# Patient Record
Sex: FEMALE | Race: WHITE | Employment: OTHER | URBAN - METROPOLITAN AREA
[De-identification: names, ages, dates, MRNs, and addresses within clinical notes are randomized per-mention and may not be internally consistent; named-entity substitution may affect disease eponyms.]

---

## 2017-03-22 ENCOUNTER — OFFICE VISIT (OUTPATIENT)
Dept: INTERNAL MEDICINE CLINIC | Facility: CLINIC | Age: 72
End: 2017-03-22

## 2017-03-22 VITALS
OXYGEN SATURATION: 99 % | RESPIRATION RATE: 18 BRPM | TEMPERATURE: 98 F | DIASTOLIC BLOOD PRESSURE: 60 MMHG | WEIGHT: 166.81 LBS | HEIGHT: 62 IN | BODY MASS INDEX: 30.69 KG/M2 | SYSTOLIC BLOOD PRESSURE: 120 MMHG | HEART RATE: 63 BPM

## 2017-03-22 DIAGNOSIS — R10.84 GENERALIZED ABDOMINAL PAIN: ICD-10-CM

## 2017-03-22 DIAGNOSIS — M54.2 NECK PAIN: ICD-10-CM

## 2017-03-22 DIAGNOSIS — M25.50 ARTHRALGIA, UNSPECIFIED JOINT: Primary | ICD-10-CM

## 2017-03-22 DIAGNOSIS — E78.49 OTHER HYPERLIPIDEMIA: ICD-10-CM

## 2017-03-22 DIAGNOSIS — K59.09 OTHER CONSTIPATION: ICD-10-CM

## 2017-03-22 LAB
ALBUMIN SERPL BCP-MCNC: 4.1 G/DL (ref 3.5–4.8)
ALBUMIN/GLOB SERPL: 1.5 {RATIO} (ref 1–2)
ALP SERPL-CCNC: 68 U/L (ref 32–100)
ALT SERPL-CCNC: 17 U/L (ref 14–54)
ANION GAP SERPL CALC-SCNC: 9 MMOL/L (ref 0–18)
AST SERPL-CCNC: 22 U/L (ref 15–41)
BILIRUB SERPL-MCNC: 0.4 MG/DL (ref 0.3–1.2)
BUN SERPL-MCNC: 12 MG/DL (ref 8–20)
BUN/CREAT SERPL: 17.4 (ref 10–20)
CALCIUM SERPL-MCNC: 9.4 MG/DL (ref 8.5–10.5)
CHLORIDE SERPL-SCNC: 105 MMOL/L (ref 95–110)
CHOLEST SERPL-MCNC: 213 MG/DL (ref 110–200)
CO2 SERPL-SCNC: 27 MMOL/L (ref 22–32)
CREAT SERPL-MCNC: 0.69 MG/DL (ref 0.5–1.5)
ERYTHROCYTE [DISTWIDTH] IN BLOOD BY AUTOMATED COUNT: 13.8 % (ref 11–15)
GLOBULIN PLAS-MCNC: 2.7 G/DL (ref 2.5–3.7)
GLUCOSE SERPL-MCNC: 84 MG/DL (ref 70–99)
HCT VFR BLD AUTO: 39.1 % (ref 35–48)
HDLC SERPL-MCNC: 47 MG/DL
HGB BLD-MCNC: 12.8 G/DL (ref 12–16)
LDLC SERPL CALC-MCNC: 135 MG/DL (ref 0–99)
MCH RBC QN AUTO: 31.7 PG (ref 27–32)
MCHC RBC AUTO-ENTMCNC: 32.9 G/DL (ref 32–37)
MCV RBC AUTO: 96.4 FL (ref 80–100)
NONHDLC SERPL-MCNC: 166 MG/DL
OSMOLALITY UR CALC.SUM OF ELEC: 291 MOSM/KG (ref 275–295)
PLATELET # BLD AUTO: 251 K/UL (ref 140–400)
PMV BLD AUTO: 8.5 FL (ref 7.4–10.3)
POTASSIUM SERPL-SCNC: 4.1 MMOL/L (ref 3.3–5.1)
PROT SERPL-MCNC: 6.8 G/DL (ref 5.9–8.4)
RBC # BLD AUTO: 4.05 M/UL (ref 3.7–5.4)
SODIUM SERPL-SCNC: 141 MMOL/L (ref 136–144)
TRIGL SERPL-MCNC: 153 MG/DL (ref 1–149)
TSH SERPL-ACNC: 2.08 UIU/ML (ref 0.34–5.6)
WBC # BLD AUTO: 4.8 K/UL (ref 4–11)

## 2017-03-22 PROCEDURE — 85027 COMPLETE CBC AUTOMATED: CPT | Performed by: INTERNAL MEDICINE

## 2017-03-22 PROCEDURE — 80053 COMPREHEN METABOLIC PANEL: CPT | Performed by: INTERNAL MEDICINE

## 2017-03-22 PROCEDURE — 99214 OFFICE O/P EST MOD 30 MIN: CPT | Performed by: INTERNAL MEDICINE

## 2017-03-22 PROCEDURE — 80061 LIPID PANEL: CPT | Performed by: INTERNAL MEDICINE

## 2017-03-22 PROCEDURE — 84443 ASSAY THYROID STIM HORMONE: CPT | Performed by: INTERNAL MEDICINE

## 2017-03-22 NOTE — PROGRESS NOTES
HPI:    Patient ID: Nima Sanchez is a 67year old female. HPIgeneralized joint pains and continued abdominal pains and constipation. Has not been to gi yet.     Review of Systems         Current Outpatient Prescriptions:  triamcinolone acetonide 0.1

## 2017-03-24 ENCOUNTER — HOSPITAL ENCOUNTER (OUTPATIENT)
Dept: GENERAL RADIOLOGY | Facility: HOSPITAL | Age: 72
Discharge: HOME | End: 2017-03-24
Attending: INTERNAL MEDICINE
Payer: MEDICAID

## 2017-03-24 DIAGNOSIS — M25.50 ARTHRALGIA, UNSPECIFIED JOINT: ICD-10-CM

## 2017-03-24 DIAGNOSIS — M54.2 NECK PAIN: ICD-10-CM

## 2017-03-24 PROCEDURE — 73120 X-RAY EXAM OF HAND: CPT

## 2017-03-24 PROCEDURE — 72050 X-RAY EXAM NECK SPINE 4/5VWS: CPT

## 2017-03-28 ENCOUNTER — TELEPHONE (OUTPATIENT)
Dept: INTERNAL MEDICINE CLINIC | Facility: CLINIC | Age: 72
End: 2017-03-28

## 2017-03-28 DIAGNOSIS — M19.042 OSTEOARTHRITIS OF BOTH HANDS, UNSPECIFIED OSTEOARTHRITIS TYPE: ICD-10-CM

## 2017-03-28 DIAGNOSIS — M47.812 OSTEOARTHRITIS OF CERVICAL SPINE, UNSPECIFIED SPINAL OSTEOARTHRITIS COMPLICATION STATUS: Primary | ICD-10-CM

## 2017-03-28 DIAGNOSIS — M19.041 OSTEOARTHRITIS OF BOTH HANDS, UNSPECIFIED OSTEOARTHRITIS TYPE: ICD-10-CM

## 2017-05-09 ENCOUNTER — OFFICE VISIT (OUTPATIENT)
Dept: OCCUPATIONAL MEDICINE | Facility: HOSPITAL | Age: 72
End: 2017-05-09
Attending: INTERNAL MEDICINE
Payer: MEDICAID

## 2017-05-09 ENCOUNTER — TELEPHONE (OUTPATIENT)
Dept: INTERNAL MEDICINE CLINIC | Facility: CLINIC | Age: 72
End: 2017-05-09

## 2017-05-09 DIAGNOSIS — M19.041 OSTEOARTHRITIS OF BOTH HANDS, UNSPECIFIED OSTEOARTHRITIS TYPE: ICD-10-CM

## 2017-05-09 DIAGNOSIS — M19.042 OSTEOARTHRITIS OF BOTH HANDS, UNSPECIFIED OSTEOARTHRITIS TYPE: ICD-10-CM

## 2017-05-09 DIAGNOSIS — M47.812 OSTEOARTHRITIS OF CERVICAL SPINE, UNSPECIFIED SPINAL OSTEOARTHRITIS COMPLICATION STATUS: Primary | ICD-10-CM

## 2017-05-09 NOTE — PROGRESS NOTES
Patient Name: Skyler Avendano, : 3/9/1945, MRN: [id number]   Date:  2017  Referring Physician:  Gricel Silverio    Diagnosis: Cervical OA and hand OA    Occupational Therapy Screen    Pt has attended 1 Occupational Therapy session for screen.     P contact me at Dept: [phone].     Sincerely,  Kim Rose    Electronically signed by therapist: Kim Rose OT

## 2017-05-11 ENCOUNTER — APPOINTMENT (OUTPATIENT)
Dept: OCCUPATIONAL MEDICINE | Facility: HOSPITAL | Age: 72
End: 2017-05-11
Attending: INTERNAL MEDICINE
Payer: MEDICAID

## 2017-05-15 ENCOUNTER — APPOINTMENT (OUTPATIENT)
Dept: OCCUPATIONAL MEDICINE | Facility: HOSPITAL | Age: 72
End: 2017-05-15
Attending: INTERNAL MEDICINE
Payer: MEDICAID

## 2017-05-17 ENCOUNTER — APPOINTMENT (OUTPATIENT)
Dept: OCCUPATIONAL MEDICINE | Facility: HOSPITAL | Age: 72
End: 2017-05-17
Attending: INTERNAL MEDICINE
Payer: MEDICAID

## 2017-05-18 ENCOUNTER — TELEPHONE (OUTPATIENT)
Dept: INTERNAL MEDICINE CLINIC | Facility: CLINIC | Age: 72
End: 2017-05-18

## 2017-05-18 ENCOUNTER — OFFICE VISIT (OUTPATIENT)
Dept: PHYSICAL THERAPY | Facility: HOSPITAL | Age: 72
End: 2017-05-18
Attending: INTERNAL MEDICINE
Payer: MEDICAID

## 2017-05-18 DIAGNOSIS — M47.812 OSTEOARTHRITIS OF CERVICAL SPINE, UNSPECIFIED SPINAL OSTEOARTHRITIS COMPLICATION STATUS: Primary | ICD-10-CM

## 2017-05-18 PROCEDURE — 97110 THERAPEUTIC EXERCISES: CPT

## 2017-05-18 PROCEDURE — 97162 PT EVAL MOD COMPLEX 30 MIN: CPT

## 2017-05-18 NOTE — PROGRESS NOTES
CERVICAL SPINE EVALUATION:   Referring Physician: Kaleb Sosa MD    Diagnosis: cervical pain Date of Service: 5/8/17   Date of Onset: 6 months prior        SUBJECTIVE:   PATIENT SUMMARY:  Fadia Knight is a 67year old y/o female who presents to t 39 +upper csp   L Rotation 51 + L upper trap   Protrusion wfl +upper csp   Retraction Dec  +skull       Shoulder AROM:     R    L   Flex dec        dec   Abd dec dec   ER dec dec   IR dec Dec     Strength UE:   -/5    R  L   Shoulder flex   4-    4-   Shou Manual Therapy; Therapeutic Exercises; Neuromuscular Re-education; Therapeutic Activity; Ultrasound;  Electrical Stim; Traction; Patient education; Home exercise program instruction;     Education or treatment limitation: None  Rehab Potential: good    FOTO

## 2017-05-22 ENCOUNTER — APPOINTMENT (OUTPATIENT)
Dept: OCCUPATIONAL MEDICINE | Facility: HOSPITAL | Age: 72
End: 2017-05-22
Attending: INTERNAL MEDICINE
Payer: MEDICAID

## 2017-05-23 ENCOUNTER — OFFICE VISIT (OUTPATIENT)
Dept: PHYSICAL THERAPY | Facility: HOSPITAL | Age: 72
End: 2017-05-23
Attending: INTERNAL MEDICINE
Payer: MEDICAID

## 2017-05-23 PROCEDURE — 97140 MANUAL THERAPY 1/> REGIONS: CPT

## 2017-05-23 PROCEDURE — 97110 THERAPEUTIC EXERCISES: CPT

## 2017-05-23 NOTE — PROGRESS NOTES
Diagnosis: Cervical pain  Authorized # of Visits:  2/6 [facility] Medicare)         Next MD visit: none scheduled  Fall Risk: standard         Precautions: n/a           Medication Changes since last visit?: No  Subjective: Patient reports she feels \"the eamon

## 2017-05-24 ENCOUNTER — APPOINTMENT (OUTPATIENT)
Dept: OCCUPATIONAL MEDICINE | Facility: HOSPITAL | Age: 72
End: 2017-05-24
Attending: INTERNAL MEDICINE
Payer: MEDICAID

## 2017-05-26 ENCOUNTER — OFFICE VISIT (OUTPATIENT)
Dept: PHYSICAL THERAPY | Facility: HOSPITAL | Age: 72
End: 2017-05-26
Attending: INTERNAL MEDICINE
Payer: MEDICAID

## 2017-05-26 PROCEDURE — 97140 MANUAL THERAPY 1/> REGIONS: CPT

## 2017-05-26 PROCEDURE — 97110 THERAPEUTIC EXERCISES: CPT

## 2017-05-26 NOTE — PROGRESS NOTES
Diagnosis: Cervical pain  Authorized # of Visits:  3/6 [facility] Medicare)         Next MD visit: none scheduled  Fall Risk: standard         Precautions: n/a           Medication Changes since last visit?: No  Subjective: Patient reports her neck is feeling

## 2017-05-31 ENCOUNTER — APPOINTMENT (OUTPATIENT)
Dept: OCCUPATIONAL MEDICINE | Facility: HOSPITAL | Age: 72
End: 2017-05-31
Attending: INTERNAL MEDICINE
Payer: MEDICAID

## 2017-06-01 ENCOUNTER — OFFICE VISIT (OUTPATIENT)
Dept: PHYSICAL THERAPY | Facility: HOSPITAL | Age: 72
End: 2017-06-01
Attending: INTERNAL MEDICINE
Payer: MEDICAID

## 2017-06-01 PROCEDURE — 97110 THERAPEUTIC EXERCISES: CPT

## 2017-06-01 PROCEDURE — 97140 MANUAL THERAPY 1/> REGIONS: CPT

## 2017-06-01 NOTE — PROGRESS NOTES
Diagnosis: Cervical pain  Authorized # of Visits:  4/6 [facility] Medicare)         Next MD visit: none scheduled  Fall Risk: standard         Precautions: n/a           Medication Changes since last visit?: No  Subjective:  \"The pain is less intense and less

## 2017-06-02 ENCOUNTER — APPOINTMENT (OUTPATIENT)
Dept: OCCUPATIONAL MEDICINE | Facility: HOSPITAL | Age: 72
End: 2017-06-02
Attending: INTERNAL MEDICINE

## 2017-06-05 ENCOUNTER — OFFICE VISIT (OUTPATIENT)
Dept: PHYSICAL THERAPY | Facility: HOSPITAL | Age: 72
End: 2017-06-05
Attending: INTERNAL MEDICINE
Payer: MEDICAID

## 2017-06-05 PROCEDURE — 97110 THERAPEUTIC EXERCISES: CPT

## 2017-06-05 PROCEDURE — 97140 MANUAL THERAPY 1/> REGIONS: CPT

## 2017-06-05 NOTE — PROGRESS NOTES
Diagnosis: Cervical pain  Authorized # of Visits:  5/6 [facility] Medicare)         Next MD visit: none scheduled  Fall Risk: standard         Precautions: n/a           Medication Changes since last visit?: No  Subjective: \"I don't have any pain in my neck.

## 2017-06-08 ENCOUNTER — OFFICE VISIT (OUTPATIENT)
Dept: PHYSICAL THERAPY | Facility: HOSPITAL | Age: 72
End: 2017-06-08
Attending: INTERNAL MEDICINE
Payer: MEDICAID

## 2017-06-08 PROCEDURE — 97110 THERAPEUTIC EXERCISES: CPT

## 2017-06-08 PROCEDURE — 97140 MANUAL THERAPY 1/> REGIONS: CPT

## 2017-06-08 NOTE — PROGRESS NOTES
Patient Name: Kishor Pereira, : 3/9/1945, MRN: [id number]   Date:  2017  Referring Physician:  Aminata Fletcher    Diagnosis: Cervical pain   Discharge Summary    Pt has attended 6, cancelled 0, and no shown 0 visits in Physical Therapy.      Progr suboccipitals, upper traps, levator, paraspinals. Upper Limb Tension Tests: improved neural tension  Neuro Screen: intact to soft touch        Goals:    1. Patient will sit with proper posture while working at computer for one hour without c/o pain.  Met

## 2017-06-12 ENCOUNTER — APPOINTMENT (OUTPATIENT)
Dept: PHYSICAL THERAPY | Facility: HOSPITAL | Age: 72
End: 2017-06-12
Attending: INTERNAL MEDICINE
Payer: MEDICAID

## 2017-06-14 ENCOUNTER — APPOINTMENT (OUTPATIENT)
Dept: PHYSICAL THERAPY | Facility: HOSPITAL | Age: 72
End: 2017-06-14
Attending: INTERNAL MEDICINE
Payer: MEDICAID

## 2017-06-15 ENCOUNTER — APPOINTMENT (OUTPATIENT)
Dept: PHYSICAL THERAPY | Facility: HOSPITAL | Age: 72
End: 2017-06-15
Attending: INTERNAL MEDICINE
Payer: MEDICAID

## 2017-07-21 ENCOUNTER — APPOINTMENT (OUTPATIENT)
Dept: GENERAL RADIOLOGY | Age: 72
End: 2017-07-21
Attending: EMERGENCY MEDICINE
Payer: MEDICAID

## 2017-07-21 ENCOUNTER — HOSPITAL ENCOUNTER (OUTPATIENT)
Age: 72
Discharge: HOME | End: 2017-07-21
Attending: EMERGENCY MEDICINE
Payer: MEDICAID

## 2017-07-21 VITALS
BODY MASS INDEX: 29 KG/M2 | DIASTOLIC BLOOD PRESSURE: 76 MMHG | SYSTOLIC BLOOD PRESSURE: 123 MMHG | OXYGEN SATURATION: 97 % | WEIGHT: 160 LBS | TEMPERATURE: 99 F | HEART RATE: 64 BPM | RESPIRATION RATE: 16 BRPM

## 2017-07-21 DIAGNOSIS — S93.601A RIGHT FOOT SPRAIN, INITIAL ENCOUNTER: Primary | ICD-10-CM

## 2017-07-21 PROCEDURE — 73630 X-RAY EXAM OF FOOT: CPT | Performed by: EMERGENCY MEDICINE

## 2017-07-21 PROCEDURE — 99213 OFFICE O/P EST LOW 20 MIN: CPT

## 2017-07-21 PROCEDURE — 99203 OFFICE O/P NEW LOW 30 MIN: CPT

## 2017-07-21 RX ORDER — ACETAMINOPHEN 325 MG/1
325 TABLET ORAL EVERY 6 HOURS PRN
COMMUNITY

## 2017-07-21 NOTE — ED PROVIDER NOTES
Patient Seen in: [address] [facility] [address] [facility] In [address] [place] [address]    History   Patient presents with:  Lower Extremity Injury (musculoskeletal)    Stated Complaint: r foot pain    HPI    Patient presents to the urgent care center after awkwardly coming down seconds. There is no abrasions lacerations or skin lesions noted. There is normal sensation light touch. There is tenderness over the second third and fourth toes into the metatarsal region just proximal to the phalanges.   There is no fifth metatarsal d

## 2017-07-21 NOTE — ED INITIAL ASSESSMENT (HPI)
Twisted right foot 3 days ago and still hurts. Pain dorsal aspect no gross edema or bruising no swelling. albe to walk.

## 2017-11-04 ENCOUNTER — NURSE ONLY (OUTPATIENT)
Dept: INTERNAL MEDICINE CLINIC | Facility: CLINIC | Age: 72
End: 2017-11-04

## 2017-11-04 DIAGNOSIS — F01.50 VASCULAR DEMENTIA WITHOUT BEHAVIORAL DISTURBANCE (HCC): ICD-10-CM

## 2017-11-04 DIAGNOSIS — E78.5 HYPERLIPIDEMIA, UNSPECIFIED HYPERLIPIDEMIA TYPE: Primary | ICD-10-CM

## 2017-11-04 PROCEDURE — 86003 ALLG SPEC IGE CRUDE XTRC EA: CPT | Performed by: INTERNAL MEDICINE

## 2017-11-04 PROCEDURE — 85025 COMPLETE CBC W/AUTO DIFF WBC: CPT | Performed by: INTERNAL MEDICINE

## 2017-11-04 PROCEDURE — 82607 VITAMIN B-12: CPT | Performed by: INTERNAL MEDICINE

## 2017-11-04 PROCEDURE — 84443 ASSAY THYROID STIM HORMONE: CPT | Performed by: INTERNAL MEDICINE

## 2017-11-04 PROCEDURE — 36415 COLL VENOUS BLD VENIPUNCTURE: CPT | Performed by: INTERNAL MEDICINE

## 2017-11-04 PROCEDURE — 80061 LIPID PANEL: CPT | Performed by: INTERNAL MEDICINE

## 2017-11-04 PROCEDURE — 80053 COMPREHEN METABOLIC PANEL: CPT | Performed by: INTERNAL MEDICINE

## 2017-11-04 PROCEDURE — 82785 ASSAY OF IGE: CPT | Performed by: INTERNAL MEDICINE

## 2017-11-04 PROCEDURE — 82306 VITAMIN D 25 HYDROXY: CPT | Performed by: INTERNAL MEDICINE

## 2017-11-04 NOTE — PROGRESS NOTES
HPI:   Elba Coleman is a 67year old female who presents for a MA Supervisit.     Patient Active Problem List:     Age-related nuclear cataract of both eyes seeing opthamology     Meibomian gland dysfunction (MGD), bilateral, both upper and lower lids day activities?: 0-No     Have you had any memory issues?: 1-Yes    Fall/Risk Scorin    Scoring Interpretation: 0 - 3 No Risk     Depression Screening (PHQ-2/PHQ-9): Over the LAST 2 WEEKS   Little interest or pleasure in doing things (over the last two :       REVIEW OF SYSTEMS:   GENERAL: feels well otherwise  SKIN: denies any unusual skin lesions  EYES: denies blurred vision or double vision  HEENT: denies nasal congestion, sinus pain or ST  LUNGS: denies shortness of breath with exertion is a 67year old female who presents for a Medicare Assessment. PLAN SUMMARY:   Diagnoses and all orders for this visit:    Vascular dementia without behavioral disturbance  -     Cancel: CT ANGIOGRAPHY, BRAIN (CONTRAST ONLY)(CPT=70496);  Future  - Annually     Bone Density Screening      Dexascan Every two years No results found for this or any previous visit. No flowsheet data found.     Pap and Pelvic      Pap  Annually if high risk There are no preventive care reminders to display for this patient 11/04/2017 146 (H)   06/24/2016 159 (H)    No flowsheet data found. Dilated Eye exam  Annually No flowsheet data found. No flowsheet data found. COPD      Spirometry Testing Annually No results found for this or any previous visit.  No flowsheet d capsule (50,000 Units total) by mouth once a week. Disp: 4 capsule Rfl: 0   acetaminophen 325 MG Oral Tab Take 325 mg by mouth every 6 (six) hours as needed for Pain.  Disp:  Rfl:    triamcinolone acetonide 0.1 % External Cream Apply topically 2 (two) times

## 2017-11-04 NOTE — PROGRESS NOTES
Pt's  verified  Pt presented for a fasting blood draw. Per Dr. Darleen Glover ordered LIPID VIT D ALLERGY REGION 8 VIT B12 CBC TSHR. Pt tolerated venipuncture well,specimens drawn from Rt  arm  and sent out to [address] [place] [address] lab for testing.

## 2017-11-07 NOTE — PROGRESS NOTES
Pt notified high cholesterol should treat and to start Zocor per Keenan Walls Fuelling all other labs okay-

## 2017-11-11 ENCOUNTER — HOSPITAL ENCOUNTER (OUTPATIENT)
Dept: CT IMAGING | Facility: HOSPITAL | Age: 72
Discharge: HOME | End: 2017-11-11
Attending: INTERNAL MEDICINE
Payer: MEDICAID

## 2017-11-11 DIAGNOSIS — F01.50 VASCULAR DEMENTIA WITHOUT BEHAVIORAL DISTURBANCE (HCC): ICD-10-CM

## 2017-11-11 PROCEDURE — 70470 CT HEAD/BRAIN W/O & W/DYE: CPT | Performed by: INTERNAL MEDICINE

## 2017-11-24 ENCOUNTER — MED REC SCAN ONLY (OUTPATIENT)
Dept: INTERNAL MEDICINE CLINIC | Facility: CLINIC | Age: 72
End: 2017-11-24

## 2017-12-07 ENCOUNTER — OFFICE VISIT (OUTPATIENT)
Dept: INTERNAL MEDICINE CLINIC | Facility: CLINIC | Age: 72
End: 2017-12-07

## 2017-12-07 VITALS
OXYGEN SATURATION: 99 % | SYSTOLIC BLOOD PRESSURE: 102 MMHG | TEMPERATURE: 98 F | BODY MASS INDEX: 29 KG/M2 | WEIGHT: 160 LBS | DIASTOLIC BLOOD PRESSURE: 62 MMHG | HEART RATE: 68 BPM

## 2017-12-07 DIAGNOSIS — E55.9 VITAMIN D DEFICIENCY: ICD-10-CM

## 2017-12-07 DIAGNOSIS — R41.3 MEMORY DEFICIT: ICD-10-CM

## 2017-12-07 DIAGNOSIS — E78.5 HYPERLIPIDEMIA, UNSPECIFIED HYPERLIPIDEMIA TYPE: Primary | ICD-10-CM

## 2017-12-07 DIAGNOSIS — R42 DIZZINESS: ICD-10-CM

## 2017-12-07 DIAGNOSIS — K21.9 GASTROESOPHAGEAL REFLUX DISEASE, ESOPHAGITIS PRESENCE NOT SPECIFIED: ICD-10-CM

## 2017-12-07 DIAGNOSIS — Z12.11 ENCOUNTER FOR SCREENING COLONOSCOPY: ICD-10-CM

## 2017-12-07 PROCEDURE — 99214 OFFICE O/P EST MOD 30 MIN: CPT | Performed by: INTERNAL MEDICINE

## 2017-12-07 NOTE — PROGRESS NOTES
Makenna Taylor is a 67year old female. Patient presents with: Follow - Up: pt presents to clinic for 1 month follow up on new rxs simvastatin and Vit D. per pt tolerating rxs well.        HPI:         Taking Vitamin D 50,000 weekly; and simvastatin 20 frequency or urgency  MUSCULOSKELETAL: denies unusual joint pain, swelling or myalgias  SKIN: denies rash or lesions  NEUROLOGICAL: denies frequent headaches, or focal deficits  HEME: denies excessive bruising or bleeding  PSYCH: denies depression or anxie Aspergillus Fumigatus <0.10 <0.10 kUA/L   Allergen Bermuda Grass <0.10 <0.10 kUA/L   Allergen Box Elder <0.10 <0.10 kUA/L   Allergen C.  Herbarum <0.10 <0.10 kUA/L   Allergen Cat Dander <0.10 <0.10 kUA/L   Allergen Cockroach <0.10 <0.10 kUA/L   Allergen Com 0.0 - 1.0 K/UL   Eosinophil Absolute 0.1 0.0 - 0.7 K/UL   Basophil Absolute 0.0 0.0 - 0.2 K/UL        Ct Brain (w+wo) (oye=89756)    Result Date: 11/11/2017  PROCEDURE: CT BRAIN (W+WO) (ORD=[id number])  COMPARISON: None available.   INDICATIONS: New onset of mem (CST): Saba Chinchilla MD on 11/11/2017 at 10:10          CONCLUSION:  1. No acute intracranial process by noncontrast/contrast-enhanced CT technique. 2. Senescent changes of parenchymal volume loss with sequela of chronic microvascular ischemic disease.

## 2018-02-28 NOTE — PROGRESS NOTES
HPI:    Patient ID: Nevaeh Nagy is a 67year old female. Patient here for fu on memory loss which is likely vascular type. Patient has good days and bad. Daughter is of the opinion of trying a medical intervention to impact on the memory. HAs had b Exam    Constitutional: She is oriented to person, place, and time. She appears well-developed and well-nourished. HENT:   Head: Normocephalic and atraumatic.    Right Ear: Tympanic membrane and ear canal normal.   Nose: Nose normal.   Slight eczema inear

## 2018-04-17 ENCOUNTER — OFFICE VISIT (OUTPATIENT)
Dept: INTERNAL MEDICINE CLINIC | Facility: CLINIC | Age: 73
End: 2018-04-17

## 2018-04-17 VITALS
OXYGEN SATURATION: 98 % | RESPIRATION RATE: 17 BRPM | BODY MASS INDEX: 30 KG/M2 | HEART RATE: 64 BPM | TEMPERATURE: 98 F | WEIGHT: 163 LBS | SYSTOLIC BLOOD PRESSURE: 132 MMHG | HEIGHT: 62 IN | DIASTOLIC BLOOD PRESSURE: 64 MMHG

## 2018-04-17 DIAGNOSIS — M81.8 OSTEOPOROSIS OF DISUSE: ICD-10-CM

## 2018-04-17 DIAGNOSIS — E78.2 MIXED HYPERLIPIDEMIA: Primary | ICD-10-CM

## 2018-04-17 DIAGNOSIS — F01.50 VASCULAR DEMENTIA WITHOUT BEHAVIORAL DISTURBANCE (HCC): ICD-10-CM

## 2018-04-17 PROCEDURE — 80053 COMPREHEN METABOLIC PANEL: CPT | Performed by: INTERNAL MEDICINE

## 2018-04-17 PROCEDURE — 80061 LIPID PANEL: CPT | Performed by: INTERNAL MEDICINE

## 2018-04-17 PROCEDURE — 99214 OFFICE O/P EST MOD 30 MIN: CPT | Performed by: INTERNAL MEDICINE

## 2018-04-17 PROCEDURE — 36415 COLL VENOUS BLD VENIPUNCTURE: CPT | Performed by: INTERNAL MEDICINE

## 2018-04-17 RX ORDER — DONEPEZIL HYDROCHLORIDE 10 MG/1
10 TABLET, FILM COATED ORAL NIGHTLY
Qty: 90 TABLET | Refills: 3 | Status: SHIPPED | OUTPATIENT
Start: 2018-04-17 | End: 2019-04-01

## 2018-04-17 NOTE — PROGRESS NOTES
Pt's  verified  Pt presented for a fasting blood draw. Per PRUSEK ordered LIPID CMP. Pt tolerated venipuncture well,specimens drawn from LT  arm  and sent out to [facility] lab for testing.

## 2018-04-17 NOTE — PROGRESS NOTES
HPI:    Patient ID: Elba Coleman is a 68year old female. Pt here for a fu on vascular dementia - has been feeling no ill side effects with Aricept rx. Appears to have no chenge in overqall behavior per daughters view. is due for lipids check - is fa cranial nerve deficit. Skin: Skin is warm and dry. No lesion noted.    Psychiatric: Her behavior is normal.   Mild to moderate memory impairment per  dtr              ASSESSMENT/PLAN:   Mixed hyperlipidemia  (primary encounter diagnosis) recheck

## 2018-04-18 RX ORDER — SIMVASTATIN 20 MG
20 TABLET ORAL NIGHTLY
Qty: 30 TABLET | Refills: 11 | Status: SHIPPED | OUTPATIENT
Start: 2018-04-18 | End: 2019-04-01

## 2018-05-29 ENCOUNTER — TELEPHONE (OUTPATIENT)
Dept: GASTROENTEROLOGY | Facility: CLINIC | Age: 73
End: 2018-05-29

## 2018-05-29 ENCOUNTER — OFFICE VISIT (OUTPATIENT)
Dept: GASTROENTEROLOGY | Facility: CLINIC | Age: 73
End: 2018-05-29

## 2018-05-29 VITALS
HEIGHT: 62 IN | BODY MASS INDEX: 30 KG/M2 | DIASTOLIC BLOOD PRESSURE: 79 MMHG | HEART RATE: 72 BPM | WEIGHT: 163 LBS | SYSTOLIC BLOOD PRESSURE: 124 MMHG

## 2018-05-29 DIAGNOSIS — Z12.11 SCREENING FOR COLON CANCER: Primary | ICD-10-CM

## 2018-05-29 DIAGNOSIS — Z87.19 HX OF GASTROESOPHAGEAL REFLUX (GERD): ICD-10-CM

## 2018-05-29 DIAGNOSIS — Z12.11 ENCOUNTER FOR COLORECTAL CANCER SCREENING: Primary | ICD-10-CM

## 2018-05-29 DIAGNOSIS — Z12.12 ENCOUNTER FOR COLORECTAL CANCER SCREENING: Primary | ICD-10-CM

## 2018-05-29 DIAGNOSIS — Z01.818 PREOPERATIVE CLEARANCE: ICD-10-CM

## 2018-05-29 DIAGNOSIS — R10.13 DYSPEPSIA: ICD-10-CM

## 2018-05-29 DIAGNOSIS — Z87.19 HISTORY OF GASTROESOPHAGEAL REFLUX (GERD): ICD-10-CM

## 2018-05-29 PROCEDURE — 99244 OFF/OP CNSLTJ NEW/EST MOD 40: CPT | Performed by: INTERNAL MEDICINE

## 2018-05-29 NOTE — PROGRESS NOTES
HPI:    Patient ID: Rudolph Psator is a 68year old female. HPI    Review of Systems   Constitutional: Negative for activity change, appetite change, chills, diaphoresis, fatigue, fever and unexpected weight change.    HENT: Negative for congestion, ear well-developed and well-nourished. No distress. HENT:   Head: Normocephalic and atraumatic. Mouth/Throat: Oropharynx is clear and moist. No oropharyngeal exudate. Eyes: Conjunctivae and EOM are normal. Pupils are equal, round, and reactive to light.

## 2018-05-29 NOTE — H&P
History of present illness: This is a 79-year-old female referred by Dr. Leopold Bunk for a colorectal cancer screening evaluation.   The patient is here with her daughter [facility]  The patient has never undergone colo plan:    1. Colorectal screening    2. Dyspepsia/GERD    3. Preoperative clearance: This is a 51-year-old female who is ASA class II and is a good candidate for colorectal screening. She also has dyspepsia and chronic GERD symptoms.   I have advised

## 2018-05-29 NOTE — TELEPHONE ENCOUNTER
Scheduled for:  Colon/EGD  Provider Name: HUNTER  Date:  6-20-18  Location:  [address] [place] [address] 1  Sedation:  MAC  Time:  9:30am, arrival 8:30am  Prep: Miralax/Gatorade  Meds/Allergies Reconciled?:  yes  Diagnosis with codes:  Screening Z12,11, dyspepsia R10.13, Hx of GERD Z

## 2018-06-20 ENCOUNTER — TELEPHONE (OUTPATIENT)
Dept: GASTROENTEROLOGY | Facility: CLINIC | Age: 73
End: 2018-06-20

## 2018-06-20 ENCOUNTER — HOSPITAL ENCOUNTER (OUTPATIENT)
Age: 73
Setting detail: HOSPITAL OUTPATIENT SURGERY
Discharge: HOME | End: 2018-06-20
Attending: INTERNAL MEDICINE | Admitting: INTERNAL MEDICINE
Payer: MEDICAID

## 2018-06-20 ENCOUNTER — SURGERY (OUTPATIENT)
Age: 73
End: 2018-06-20

## 2018-06-20 ENCOUNTER — ANESTHESIA EVENT (OUTPATIENT)
Dept: ENDOSCOPY | Age: 73
End: 2018-06-20
Payer: MEDICAID

## 2018-06-20 ENCOUNTER — ANESTHESIA (OUTPATIENT)
Dept: ENDOSCOPY | Age: 73
End: 2018-06-20
Payer: MEDICAID

## 2018-06-20 VITALS
HEART RATE: 65 BPM | TEMPERATURE: 99 F | WEIGHT: 160 LBS | OXYGEN SATURATION: 98 % | RESPIRATION RATE: 18 BRPM | BODY MASS INDEX: 26.66 KG/M2 | SYSTOLIC BLOOD PRESSURE: 116 MMHG | HEIGHT: 65 IN | DIASTOLIC BLOOD PRESSURE: 70 MMHG

## 2018-06-20 DIAGNOSIS — Z87.19 HISTORY OF GASTROESOPHAGEAL REFLUX (GERD): ICD-10-CM

## 2018-06-20 DIAGNOSIS — Z12.11 SCREENING FOR COLON CANCER: ICD-10-CM

## 2018-06-20 DIAGNOSIS — R10.13 DYSPEPSIA: ICD-10-CM

## 2018-06-20 PROBLEM — K31.9 GASTROPATHY: Status: ACTIVE | Noted: 2018-06-20

## 2018-06-20 PROBLEM — K29.40 ATROPHIC GASTRITIS: Status: ACTIVE | Noted: 2018-06-20

## 2018-06-20 PROBLEM — K64.8 INTERNAL HEMORRHOIDS WITHOUT COMPLICATION: Status: ACTIVE | Noted: 2018-06-20

## 2018-06-20 PROBLEM — K57.30 DIVERTICULOSIS LARGE INTESTINE W/O PERFORATION OR ABSCESS W/O BLEEDING: Status: ACTIVE | Noted: 2018-06-20

## 2018-06-20 PROCEDURE — 45378 DIAGNOSTIC COLONOSCOPY: CPT | Performed by: INTERNAL MEDICINE

## 2018-06-20 PROCEDURE — 99070 SPECIAL SUPPLIES PHYS/QHP: CPT | Performed by: INTERNAL MEDICINE

## 2018-06-20 PROCEDURE — 43239 EGD BIOPSY SINGLE/MULTIPLE: CPT | Performed by: INTERNAL MEDICINE

## 2018-06-20 RX ORDER — SODIUM CHLORIDE, SODIUM LACTATE, POTASSIUM CHLORIDE, CALCIUM CHLORIDE 600; 310; 30; 20 MG/100ML; MG/100ML; MG/100ML; MG/100ML
INJECTION, SOLUTION INTRAVENOUS CONTINUOUS
Status: CANCELLED | OUTPATIENT
Start: 2018-06-20

## 2018-06-20 RX ORDER — NALOXONE HYDROCHLORIDE 0.4 MG/ML
80 INJECTION, SOLUTION INTRAMUSCULAR; INTRAVENOUS; SUBCUTANEOUS AS NEEDED
Status: CANCELLED | OUTPATIENT
Start: 2018-06-20 | End: 2018-06-20

## 2018-06-20 RX ORDER — LIDOCAINE HYDROCHLORIDE 10 MG/ML
INJECTION, SOLUTION EPIDURAL; INFILTRATION; INTRACAUDAL; PERINEURAL AS NEEDED
Status: DISCONTINUED | OUTPATIENT
Start: 2018-06-20 | End: 2018-06-20 | Stop reason: SURG

## 2018-06-20 RX ORDER — SODIUM CHLORIDE, SODIUM LACTATE, POTASSIUM CHLORIDE, CALCIUM CHLORIDE 600; 310; 30; 20 MG/100ML; MG/100ML; MG/100ML; MG/100ML
INJECTION, SOLUTION INTRAVENOUS CONTINUOUS PRN
Status: DISCONTINUED | OUTPATIENT
Start: 2018-06-20 | End: 2018-06-20 | Stop reason: SURG

## 2018-06-20 RX ADMIN — SODIUM CHLORIDE, SODIUM LACTATE, POTASSIUM CHLORIDE, CALCIUM CHLORIDE: 600; 310; 30; 20 INJECTION, SOLUTION INTRAVENOUS at 09:42:00

## 2018-06-20 RX ADMIN — LIDOCAINE HYDROCHLORIDE 50 MG: 10 INJECTION, SOLUTION EPIDURAL; INFILTRATION; INTRACAUDAL; PERINEURAL at 09:44:00

## 2018-06-20 NOTE — OPERATIVE REPORT
[facility]    PATIENT'S NAME: Osmany Ng   ATTENDING PHYSICIAN: Edmar Smith MD   OPERATING PHYSICIAN: Edmar Smith MD   PATIENT ACCOUNT#:   [id number]    LOCATION:  [address] ENDO POOL ROOMS 5 [facility] 10:08:07  t: 06/20/2018 10:15:11  [facility] [id number]  [facility]/

## 2018-06-20 NOTE — BRIEF OP NOTE
Pre-Operative Diagnosis: Dyspepsia, History of gastroesophageal reflux (GERD)  Screening for colon cancer     Post-Operative Diagnosis: Diverticulosis, internal hemorrhoids; gastropathy, atrophic gastritis     Procedure Performed:   Procedure(s):  Raymon Mckenna

## 2018-06-20 NOTE — ANESTHESIA PREPROCEDURE EVALUATION
Anesthesia PreOp Note    HPI:     Wisam Lopez is a 68year old female who presents for preoperative consultation requested by: Cindy Perez MD    Date of Surgery: 6/20/2018    Procedure(s):  COLONOSCOPY  ESOPHAGOGASTRODUODENOSCOPY (EGD) status: Never Smoker    Smokeless tobacco: Never Used    Alcohol use No    Drug use: No    Sexual activity: Not on file     Other Topics Concern   None on file     Social History Narrative   None on file       Available pre-op labs reviewed.        Lab Resu

## 2018-06-20 NOTE — OPERATIVE REPORT
[facility]    PATIENT'S NAME: Radha Huntleyvicente   ATTENDING PHYSICIAN: Lele Saleem MD   OPERATING PHYSICIAN: Lele Saleem MD   PATIENT ACCOUNT#:   [id number]    LOCATION:  [address] ENDO POOL ROOMS 5 [facility] gastric body and fundus. Biopsies x4 were taken of the gastric antrum, and biopsies x4 were taken of the gastric body to rule out H. pylori gastritis or other infiltrative process. Retroflexion of the endoscope showed a normal GE junction.   The pylorus w

## 2018-06-20 NOTE — H&P
History & Physical Examination    Patient Name: Darlene Luna  MRN: [id number]  [facility]: [id number]  YOB: 1945    Diagnosis: Colorectal screening, dyspepsia      Prescriptions Prior to Admission:  aspirin 81 MG Oral Tab Take 81 mg by mouth daily

## 2018-06-25 ENCOUNTER — TELEPHONE (OUTPATIENT)
Dept: GASTROENTEROLOGY | Facility: CLINIC | Age: 73
End: 2018-06-25

## 2018-06-25 NOTE — TELEPHONE ENCOUNTER
Message   Received: Today   Message Contents   Cindy Perez MD  [facility] Clinical Staff             Please send letter and recall colonoscopy for 10 years, then close the encounter.

## 2018-06-25 NOTE — TELEPHONE ENCOUNTER
Entered into EPIC:Recall colon in 10 years per Dr. Bijal Dorado. Last Colon done 6/20/18, next due 6/20/28. Snapshot updated.

## 2018-10-01 ENCOUNTER — OFFICE VISIT (OUTPATIENT)
Dept: INTERNAL MEDICINE CLINIC | Facility: CLINIC | Age: 73
End: 2018-10-01
Payer: MEDICAID

## 2018-10-01 VITALS
SYSTOLIC BLOOD PRESSURE: 124 MMHG | TEMPERATURE: 99 F | DIASTOLIC BLOOD PRESSURE: 66 MMHG | HEART RATE: 71 BPM | BODY MASS INDEX: 29.63 KG/M2 | OXYGEN SATURATION: 99 % | HEIGHT: 62 IN | WEIGHT: 161 LBS

## 2018-10-01 DIAGNOSIS — E78.2 MIXED HYPERLIPIDEMIA: Primary | ICD-10-CM

## 2018-10-01 DIAGNOSIS — F01.50 VASCULAR DEMENTIA WITHOUT BEHAVIORAL DISTURBANCE (HCC): ICD-10-CM

## 2018-10-01 DIAGNOSIS — E78.5 HYPERLIPIDEMIA, UNSPECIFIED HYPERLIPIDEMIA TYPE: ICD-10-CM

## 2018-10-01 DIAGNOSIS — Z23 NEED FOR INFLUENZA VACCINATION: ICD-10-CM

## 2018-10-01 PROCEDURE — 99214 OFFICE O/P EST MOD 30 MIN: CPT | Performed by: INTERNAL MEDICINE

## 2018-10-08 ENCOUNTER — NURSE ONLY (OUTPATIENT)
Dept: INTERNAL MEDICINE CLINIC | Facility: CLINIC | Age: 73
End: 2018-10-08
Payer: MEDICAID

## 2018-10-08 DIAGNOSIS — H25.13 AGE-RELATED NUCLEAR CATARACT OF BOTH EYES: ICD-10-CM

## 2018-10-08 DIAGNOSIS — K64.8 INTERNAL HEMORRHOIDS WITHOUT COMPLICATION: ICD-10-CM

## 2018-10-08 DIAGNOSIS — F01.50 VASCULAR DEMENTIA WITHOUT BEHAVIORAL DISTURBANCE (HCC): ICD-10-CM

## 2018-10-08 DIAGNOSIS — K31.9 GASTROPATHY: ICD-10-CM

## 2018-10-08 DIAGNOSIS — H02.88B MEIBOMIAN GLAND DYSFUNCTION (MGD), BILATERAL, BOTH UPPER AND LOWER LIDS: ICD-10-CM

## 2018-10-08 DIAGNOSIS — E78.2 MIXED HYPERLIPIDEMIA: ICD-10-CM

## 2018-10-08 DIAGNOSIS — H02.88A MEIBOMIAN GLAND DYSFUNCTION (MGD), BILATERAL, BOTH UPPER AND LOWER LIDS: ICD-10-CM

## 2018-10-08 DIAGNOSIS — K57.30 DIVERTICULOSIS LARGE INTESTINE W/O PERFORATION OR ABSCESS W/O BLEEDING: ICD-10-CM

## 2018-10-08 PROCEDURE — 80061 LIPID PANEL: CPT | Performed by: INTERNAL MEDICINE

## 2018-10-08 PROCEDURE — 36415 COLL VENOUS BLD VENIPUNCTURE: CPT | Performed by: INTERNAL MEDICINE

## 2018-10-08 PROCEDURE — 85025 COMPLETE CBC W/AUTO DIFF WBC: CPT | Performed by: INTERNAL MEDICINE

## 2018-10-08 PROCEDURE — 80053 COMPREHEN METABOLIC PANEL: CPT | Performed by: INTERNAL MEDICINE

## 2018-11-30 ENCOUNTER — OFFICE VISIT (OUTPATIENT)
Dept: INTERNAL MEDICINE CLINIC | Facility: CLINIC | Age: 73
End: 2018-11-30
Payer: MEDICAID

## 2018-11-30 VITALS
HEART RATE: 70 BPM | DIASTOLIC BLOOD PRESSURE: 76 MMHG | SYSTOLIC BLOOD PRESSURE: 138 MMHG | OXYGEN SATURATION: 99 % | WEIGHT: 159 LBS | BODY MASS INDEX: 29.26 KG/M2 | HEIGHT: 62 IN | TEMPERATURE: 98 F

## 2018-11-30 DIAGNOSIS — J20.9 ACUTE BRONCHITIS, UNSPECIFIED ORGANISM: Primary | ICD-10-CM

## 2018-11-30 PROCEDURE — 99213 OFFICE O/P EST LOW 20 MIN: CPT | Performed by: INTERNAL MEDICINE

## 2018-11-30 RX ORDER — AZITHROMYCIN 250 MG/1
TABLET, FILM COATED ORAL
Qty: 6 TABLET | Refills: 0 | Status: SHIPPED | OUTPATIENT
Start: 2018-11-30 | End: 2019-02-27 | Stop reason: ALTCHOICE

## 2018-11-30 NOTE — PROGRESS NOTES
HPI:    Patient ID: Makenna Taylor is a 68year old female. Pt here for evaluation of a dry cough and malaise for over a week. Has been taking otc rx with no relief. Review of Systems   Constitutional: Positive for fatigue. Negative for fever.    HE Visit:  Requested Prescriptions      No prescriptions requested or ordered in this encounter       Imaging & Referrals:  None        Genoveva Montes MD  11/30/2018

## 2018-12-04 ENCOUNTER — DOCUMENTATION ONLY (OUTPATIENT)
Dept: INTERNAL MEDICINE CLINIC | Facility: CLINIC | Age: 73
End: 2018-12-04

## 2019-02-27 ENCOUNTER — OFFICE VISIT (OUTPATIENT)
Dept: INTERNAL MEDICINE CLINIC | Facility: CLINIC | Age: 74
End: 2019-02-27
Payer: MEDICAID

## 2019-02-27 VITALS
DIASTOLIC BLOOD PRESSURE: 70 MMHG | OXYGEN SATURATION: 99 % | HEART RATE: 71 BPM | WEIGHT: 159 LBS | BODY MASS INDEX: 29.26 KG/M2 | HEIGHT: 62 IN | SYSTOLIC BLOOD PRESSURE: 102 MMHG

## 2019-02-27 DIAGNOSIS — R10.11 RIGHT UPPER QUADRANT PAIN: Primary | ICD-10-CM

## 2019-02-27 DIAGNOSIS — K21.9 GASTROESOPHAGEAL REFLUX DISEASE, ESOPHAGITIS PRESENCE NOT SPECIFIED: ICD-10-CM

## 2019-02-27 PROCEDURE — 99214 OFFICE O/P EST MOD 30 MIN: CPT | Performed by: INTERNAL MEDICINE

## 2019-02-27 RX ORDER — RANITIDINE 150 MG/1
150 TABLET ORAL 2 TIMES DAILY
Qty: 60 TABLET | Refills: 1 | Status: SHIPPED | OUTPATIENT
Start: 2019-02-27 | End: 2019-03-16

## 2019-03-05 ENCOUNTER — TELEPHONE (OUTPATIENT)
Dept: INTERNAL MEDICINE CLINIC | Facility: CLINIC | Age: 74
End: 2019-03-05

## 2019-03-05 NOTE — TELEPHONE ENCOUNTER
LVM that Echo of abdomen approved and can make appt anytime.     Authorization Number:  [id number]   Review Date: 3/5/2019     Expiration Date: 4/19/2019   CPT Code: 03126   Description: Echo exam of abdomen     Primary Diagnosis Code: R10.11   Description:

## 2019-03-06 ENCOUNTER — HOSPITAL ENCOUNTER (OUTPATIENT)
Dept: ULTRASOUND IMAGING | Age: 74
Discharge: HOME | End: 2019-03-06
Attending: INTERNAL MEDICINE
Payer: MEDICAID

## 2019-03-06 DIAGNOSIS — R10.11 RIGHT UPPER QUADRANT PAIN: ICD-10-CM

## 2019-03-06 PROCEDURE — 76705 ECHO EXAM OF ABDOMEN: CPT | Performed by: INTERNAL MEDICINE

## 2019-03-08 ENCOUNTER — OFFICE VISIT (OUTPATIENT)
Dept: INTERNAL MEDICINE CLINIC | Facility: CLINIC | Age: 74
End: 2019-03-08
Payer: MEDICAID

## 2019-03-08 VITALS
HEART RATE: 68 BPM | SYSTOLIC BLOOD PRESSURE: 116 MMHG | DIASTOLIC BLOOD PRESSURE: 70 MMHG | WEIGHT: 147 LBS | BODY MASS INDEX: 27.05 KG/M2 | HEIGHT: 62 IN | OXYGEN SATURATION: 99 %

## 2019-03-08 DIAGNOSIS — K81.1 CHRONIC CHOLECYSTITIS: Primary | ICD-10-CM

## 2019-03-08 LAB
ALBUMIN SERPL-MCNC: 4.3 G/DL (ref 3.4–5)
ALBUMIN/GLOB SERPL: 1.2 {RATIO} (ref 1–2)
ALP LIVER SERPL-CCNC: 86 U/L (ref 55–142)
ALT SERPL-CCNC: 25 U/L (ref 13–56)
ANION GAP SERPL CALC-SCNC: 7 MMOL/L (ref 0–18)
APTT PPP: 28.3 SECONDS (ref 23.2–35.3)
AST SERPL-CCNC: 19 U/L (ref 15–37)
BASOPHILS # BLD AUTO: 0.06 X10(3) UL (ref 0–0.2)
BASOPHILS NFR BLD AUTO: 1.1 %
BILIRUB SERPL-MCNC: 0.6 MG/DL (ref 0.1–2)
BUN BLD-MCNC: 11 MG/DL (ref 7–18)
BUN/CREAT SERPL: 13.4 (ref 10–20)
CALCIUM BLD-MCNC: 9.6 MG/DL (ref 8.5–10.1)
CHLORIDE SERPL-SCNC: 108 MMOL/L (ref 98–107)
CO2 SERPL-SCNC: 27 MMOL/L (ref 21–32)
CREAT BLD-MCNC: 0.82 MG/DL (ref 0.55–1.02)
DEPRECATED RDW RBC AUTO: 48.4 FL (ref 35.1–46.3)
EOSINOPHIL # BLD AUTO: 0.07 X10(3) UL (ref 0–0.7)
EOSINOPHIL NFR BLD AUTO: 1.3 %
ERYTHROCYTE [DISTWIDTH] IN BLOOD BY AUTOMATED COUNT: 13.4 % (ref 11–15)
GLOBULIN PLAS-MCNC: 3.6 G/DL (ref 2.8–4.4)
GLUCOSE BLD-MCNC: 83 MG/DL (ref 70–99)
HCT VFR BLD AUTO: 43.7 % (ref 35–48)
HGB BLD-MCNC: 14 G/DL (ref 12–16)
IMM GRANULOCYTES # BLD AUTO: 0.01 X10(3) UL (ref 0–1)
IMM GRANULOCYTES NFR BLD: 0.2 %
INR BLD: 1.01 (ref 0.9–1.2)
LYMPHOCYTES # BLD AUTO: 2.1 X10(3) UL (ref 1–4)
LYMPHOCYTES NFR BLD AUTO: 39.5 %
M PROTEIN MFR SERPL ELPH: 7.9 G/DL (ref 6.4–8.2)
MCH RBC QN AUTO: 31.2 PG (ref 26–34)
MCHC RBC AUTO-ENTMCNC: 32 G/DL (ref 31–37)
MCV RBC AUTO: 97.3 FL (ref 80–100)
MONOCYTES # BLD AUTO: 0.4 X10(3) UL (ref 0.1–1)
MONOCYTES NFR BLD AUTO: 7.5 %
NEUTROPHILS # BLD AUTO: 2.68 X10 (3) UL (ref 1.5–7.7)
NEUTROPHILS # BLD AUTO: 2.68 X10(3) UL (ref 1.5–7.7)
NEUTROPHILS NFR BLD AUTO: 50.4 %
OSMOLALITY SERPL CALC.SUM OF ELEC: 293 MOSM/KG (ref 275–295)
PLATELET # BLD AUTO: 272 10(3)UL (ref 150–450)
POTASSIUM SERPL-SCNC: 4.1 MMOL/L (ref 3.5–5.1)
PROTHROMBIN TIME: 13.1 SECONDS (ref 11.8–14.5)
RBC # BLD AUTO: 4.49 X10(6)UL (ref 3.8–5.3)
SODIUM SERPL-SCNC: 142 MMOL/L (ref 136–145)
WBC # BLD AUTO: 5.3 X10(3) UL (ref 4–11)

## 2019-03-08 PROCEDURE — 85610 PROTHROMBIN TIME: CPT | Performed by: INTERNAL MEDICINE

## 2019-03-08 PROCEDURE — 85730 THROMBOPLASTIN TIME PARTIAL: CPT | Performed by: INTERNAL MEDICINE

## 2019-03-08 PROCEDURE — 99214 OFFICE O/P EST MOD 30 MIN: CPT | Performed by: INTERNAL MEDICINE

## 2019-03-08 PROCEDURE — 85025 COMPLETE CBC W/AUTO DIFF WBC: CPT | Performed by: INTERNAL MEDICINE

## 2019-03-08 PROCEDURE — 80053 COMPREHEN METABOLIC PANEL: CPT | Performed by: INTERNAL MEDICINE

## 2019-03-08 NOTE — PROGRESS NOTES
HPI:    Patient ID: Jorge Zamudio is a 68year old female. Pt here for fu after a recent GB us which shows gallbladder sludge and small stones. Has continued obstipation and belching after foods.   Explained that elective cholecystectomy would be advis COMPONENT:       Kelley Carrillo MD  3/8/2019

## 2019-03-20 ENCOUNTER — HOSPITAL ENCOUNTER (OUTPATIENT)
Facility: HOSPITAL | Age: 74
Setting detail: HOSPITAL OUTPATIENT SURGERY
Discharge: HOME | End: 2019-03-20
Attending: SURGERY | Admitting: SURGERY
Payer: MEDICAID

## 2019-03-20 ENCOUNTER — ANESTHESIA (OUTPATIENT)
Dept: SURGERY | Facility: HOSPITAL | Age: 74
End: 2019-03-20
Payer: MEDICAID

## 2019-03-20 ENCOUNTER — ANESTHESIA EVENT (OUTPATIENT)
Dept: SURGERY | Facility: HOSPITAL | Age: 74
End: 2019-03-20
Payer: MEDICAID

## 2019-03-20 VITALS
OXYGEN SATURATION: 93 % | TEMPERATURE: 98 F | WEIGHT: 156.38 LBS | HEART RATE: 50 BPM | BODY MASS INDEX: 26.05 KG/M2 | DIASTOLIC BLOOD PRESSURE: 62 MMHG | SYSTOLIC BLOOD PRESSURE: 116 MMHG | RESPIRATION RATE: 16 BRPM | HEIGHT: 65 IN

## 2019-03-20 DIAGNOSIS — K80.10 CHRONIC CHOLECYSTITIS WITH CALCULUS: Primary | ICD-10-CM

## 2019-03-20 PROCEDURE — 0FT44ZZ RESECTION OF GALLBLADDER, PERCUTANEOUS ENDOSCOPIC APPROACH: ICD-10-PCS | Performed by: SURGERY

## 2019-03-20 PROCEDURE — 88304 TISSUE EXAM BY PATHOLOGIST: CPT | Performed by: SURGERY

## 2019-03-20 RX ORDER — ONDANSETRON 2 MG/ML
4 INJECTION INTRAMUSCULAR; INTRAVENOUS EVERY 6 HOURS PRN
Status: CANCELLED | OUTPATIENT
Start: 2019-03-20

## 2019-03-20 RX ORDER — HYDROCODONE BITARTRATE AND ACETAMINOPHEN 7.5; 325 MG/1; MG/1
1 TABLET ORAL EVERY 4 HOURS PRN
Status: CANCELLED | OUTPATIENT
Start: 2019-03-20

## 2019-03-20 RX ORDER — ACETAMINOPHEN 10 MG/ML
1000 INJECTION, SOLUTION INTRAVENOUS ONCE
Status: COMPLETED | OUTPATIENT
Start: 2019-03-20 | End: 2019-03-20

## 2019-03-20 RX ORDER — LIDOCAINE HYDROCHLORIDE 10 MG/ML
INJECTION, SOLUTION EPIDURAL; INFILTRATION; INTRACAUDAL; PERINEURAL AS NEEDED
Status: DISCONTINUED | OUTPATIENT
Start: 2019-03-20 | End: 2019-03-20 | Stop reason: SURG

## 2019-03-20 RX ORDER — HYDROCODONE BITARTRATE AND ACETAMINOPHEN 5; 325 MG/1; MG/1
2 TABLET ORAL AS NEEDED
Status: DISCONTINUED | OUTPATIENT
Start: 2019-03-20 | End: 2019-03-20

## 2019-03-20 RX ORDER — ONDANSETRON 2 MG/ML
INJECTION INTRAMUSCULAR; INTRAVENOUS AS NEEDED
Status: DISCONTINUED | OUTPATIENT
Start: 2019-03-20 | End: 2019-03-20 | Stop reason: SURG

## 2019-03-20 RX ORDER — MORPHINE SULFATE 2 MG/ML
2 INJECTION, SOLUTION INTRAMUSCULAR; INTRAVENOUS EVERY 10 MIN PRN
Status: DISCONTINUED | OUTPATIENT
Start: 2019-03-20 | End: 2019-03-20

## 2019-03-20 RX ORDER — MORPHINE SULFATE 10 MG/ML
6 INJECTION, SOLUTION INTRAMUSCULAR; INTRAVENOUS EVERY 10 MIN PRN
Status: DISCONTINUED | OUTPATIENT
Start: 2019-03-20 | End: 2019-03-20

## 2019-03-20 RX ORDER — CEFAZOLIN SODIUM/WATER 2 G/20 ML
2 SYRINGE (ML) INTRAVENOUS ONCE
Status: COMPLETED | OUTPATIENT
Start: 2019-03-20 | End: 2019-03-20

## 2019-03-20 RX ORDER — ACETAMINOPHEN 325 MG/1
650 TABLET ORAL EVERY 4 HOURS PRN
Status: CANCELLED | OUTPATIENT
Start: 2019-03-20

## 2019-03-20 RX ORDER — ROCURONIUM BROMIDE 10 MG/ML
INJECTION, SOLUTION INTRAVENOUS AS NEEDED
Status: DISCONTINUED | OUTPATIENT
Start: 2019-03-20 | End: 2019-03-20 | Stop reason: SURG

## 2019-03-20 RX ORDER — MIDAZOLAM HYDROCHLORIDE 1 MG/ML
INJECTION INTRAMUSCULAR; INTRAVENOUS AS NEEDED
Status: DISCONTINUED | OUTPATIENT
Start: 2019-03-20 | End: 2019-03-20 | Stop reason: SURG

## 2019-03-20 RX ORDER — METOCLOPRAMIDE 10 MG/1
10 TABLET ORAL ONCE
Status: DISCONTINUED | OUTPATIENT
Start: 2019-03-20 | End: 2019-03-20 | Stop reason: HOSPADM

## 2019-03-20 RX ORDER — HYDROCODONE BITARTRATE AND ACETAMINOPHEN 7.5; 325 MG/1; MG/1
2 TABLET ORAL EVERY 4 HOURS PRN
Status: CANCELLED | OUTPATIENT
Start: 2019-03-20

## 2019-03-20 RX ORDER — GLYCOPYRROLATE 0.2 MG/ML
INJECTION INTRAMUSCULAR; INTRAVENOUS AS NEEDED
Status: DISCONTINUED | OUTPATIENT
Start: 2019-03-20 | End: 2019-03-20 | Stop reason: SURG

## 2019-03-20 RX ORDER — NALOXONE HYDROCHLORIDE 0.4 MG/ML
80 INJECTION, SOLUTION INTRAMUSCULAR; INTRAVENOUS; SUBCUTANEOUS AS NEEDED
Status: DISCONTINUED | OUTPATIENT
Start: 2019-03-20 | End: 2019-03-20

## 2019-03-20 RX ORDER — ACETAMINOPHEN 500 MG
1000 TABLET ORAL ONCE
Status: COMPLETED | OUTPATIENT
Start: 2019-03-20 | End: 2019-03-20

## 2019-03-20 RX ORDER — HYDROCODONE BITARTRATE AND ACETAMINOPHEN 5; 325 MG/1; MG/1
1 TABLET ORAL AS NEEDED
Status: DISCONTINUED | OUTPATIENT
Start: 2019-03-20 | End: 2019-03-20

## 2019-03-20 RX ORDER — SODIUM CHLORIDE 9 MG/ML
INJECTION, SOLUTION INTRAVENOUS CONTINUOUS
Status: CANCELLED | OUTPATIENT
Start: 2019-03-20

## 2019-03-20 RX ORDER — HALOPERIDOL 5 MG/ML
0.25 INJECTION INTRAMUSCULAR ONCE AS NEEDED
Status: DISCONTINUED | OUTPATIENT
Start: 2019-03-20 | End: 2019-03-20

## 2019-03-20 RX ORDER — SODIUM CHLORIDE, SODIUM LACTATE, POTASSIUM CHLORIDE, CALCIUM CHLORIDE 600; 310; 30; 20 MG/100ML; MG/100ML; MG/100ML; MG/100ML
INJECTION, SOLUTION INTRAVENOUS CONTINUOUS
Status: DISCONTINUED | OUTPATIENT
Start: 2019-03-20 | End: 2019-03-20

## 2019-03-20 RX ORDER — ONDANSETRON 2 MG/ML
4 INJECTION INTRAMUSCULAR; INTRAVENOUS ONCE AS NEEDED
Status: DISCONTINUED | OUTPATIENT
Start: 2019-03-20 | End: 2019-03-20

## 2019-03-20 RX ORDER — MORPHINE SULFATE 4 MG/ML
4 INJECTION, SOLUTION INTRAMUSCULAR; INTRAVENOUS EVERY 10 MIN PRN
Status: DISCONTINUED | OUTPATIENT
Start: 2019-03-20 | End: 2019-03-20

## 2019-03-20 RX ORDER — NEOSTIGMINE METHYLSULFATE 0.5 MG/ML
INJECTION INTRAVENOUS AS NEEDED
Status: DISCONTINUED | OUTPATIENT
Start: 2019-03-20 | End: 2019-03-20 | Stop reason: SURG

## 2019-03-20 RX ORDER — DEXAMETHASONE SODIUM PHOSPHATE 4 MG/ML
VIAL (ML) INJECTION AS NEEDED
Status: DISCONTINUED | OUTPATIENT
Start: 2019-03-20 | End: 2019-03-20 | Stop reason: SURG

## 2019-03-20 RX ORDER — FAMOTIDINE 20 MG/1
20 TABLET ORAL ONCE
Status: DISCONTINUED | OUTPATIENT
Start: 2019-03-20 | End: 2019-03-20 | Stop reason: HOSPADM

## 2019-03-20 RX ORDER — BUPIVACAINE HYDROCHLORIDE 2.5 MG/ML
INJECTION, SOLUTION EPIDURAL; INFILTRATION; INTRACAUDAL AS NEEDED
Status: DISCONTINUED | OUTPATIENT
Start: 2019-03-20 | End: 2019-03-20 | Stop reason: HOSPADM

## 2019-03-20 RX ADMIN — ROCURONIUM BROMIDE 30 MG: 10 INJECTION, SOLUTION INTRAVENOUS at 10:03:00

## 2019-03-20 RX ADMIN — DEXAMETHASONE SODIUM PHOSPHATE 4 MG: 4 MG/ML VIAL (ML) INJECTION at 10:15:00

## 2019-03-20 RX ADMIN — NEOSTIGMINE METHYLSULFATE 5 MG: 0.5 INJECTION INTRAVENOUS at 11:07:00

## 2019-03-20 RX ADMIN — GLYCOPYRROLATE 0.8 MG: 0.2 INJECTION INTRAMUSCULAR; INTRAVENOUS at 11:07:00

## 2019-03-20 RX ADMIN — MIDAZOLAM HYDROCHLORIDE 1 MG: 1 INJECTION INTRAMUSCULAR; INTRAVENOUS at 10:00:00

## 2019-03-20 RX ADMIN — CEFAZOLIN SODIUM/WATER 2 G: 2 G/20 ML SYRINGE (ML) INTRAVENOUS at 10:12:00

## 2019-03-20 RX ADMIN — ONDANSETRON 4 MG: 2 INJECTION INTRAMUSCULAR; INTRAVENOUS at 10:15:00

## 2019-03-20 RX ADMIN — SODIUM CHLORIDE, SODIUM LACTATE, POTASSIUM CHLORIDE, CALCIUM CHLORIDE: 600; 310; 30; 20 INJECTION, SOLUTION INTRAVENOUS at 09:59:00

## 2019-03-20 RX ADMIN — LIDOCAINE HYDROCHLORIDE 50 MG: 10 INJECTION, SOLUTION EPIDURAL; INFILTRATION; INTRACAUDAL; PERINEURAL at 10:03:00

## 2019-03-20 NOTE — ANESTHESIA POSTPROCEDURE EVALUATION
Patient: Stephanie Castillo    Procedure Summary     Date:  03/20/19 Room / Location:  [address] [place] [address] MAIN OR 11 / [address] [place] [address] MAIN OR    Anesthesia Start:  0319 Anesthesia Stop:      Procedure:  LAPAROSCOPIC CHOLECYSTECTOMY (N/A Abdomen) Diagnosis:  (chronic cholecystitis with

## 2019-03-20 NOTE — ANESTHESIA PREPROCEDURE EVALUATION
Anesthesia PreOp Note    HPI:     Spike Pfeiffer is a 76year old female who presents for preoperative consultation requested by: Scott Serrano MD    Date of Surgery: 3/20/2019    Procedure(s):  LAPAROSCOPIC CHOLECYSTECTOMY  Indication: chronic c mg total) by mouth nightly. Disp: 30 tablet Rfl: 11 3/19/2019 at 1800   Donepezil HCl (ARICEPT) 10 MG Oral Tab Take 1 tablet (10 mg total) by mouth nightly.  Disp: 90 tablet Rfl: 3 3/19/2019 at 1800   triamcinolone acetonide 0.1 % External Cream Apply topic week: Not on file        Minutes per session: Not on file      Stress: Not on file    Relationships      Social connections:        Talks on phone: Not on file        Gets together: Not on file        Attends Shinto service: Not on file        Active me Exam     Patient summary reviewed and Nursing notes reviewed    Airway   Mallampati: I  TM distance: >3 FB  Neck ROM: full  Dental - normal exam   (+) upper dentures and lower dentures    Pulmonary - negative ROS and normal exam   Cardiovascular - negative

## 2019-03-20 NOTE — ANESTHESIA PROCEDURE NOTES
ANESTHESIA INTUBATION  Urgency: elective    Airway not difficult    General Information and Staff    Patient location during procedure: OR  Anesthesiologist: Laura Bernheim, MD  Resident/CRNA: Gray Sue CRNA  Performed: anesthesiologist an

## 2019-03-20 NOTE — OPERATIVE REPORT
[facility]    PATIENT'S NAME: Zofia Slick   ATTENDING PHYSICIAN: Dorcas Frankel. MD Irasema   OPERATING PHYSICIAN: Dorcas Frankel.  Roger Jackson MD   PATIENT ACCOUNT#:   [id number]    LOCATION:  [facility] [address] PACU 2 [facility] 10  MEDICAL RECORD #:   [id number] There was good clear return. No evidence of any bleeding or bile leak. All trocars removed under direct visualization. Umbilical fascial sutures tied. Additional sutures placed as needed. Skin was closed with 4-0 PDS. Dermabond applied.   There were

## 2019-03-20 NOTE — BRIEF OP NOTE
Pre-Operative Diagnosis: chronic cholecystitis with cholelithiasis     Post-Operative Diagnosis: chronic cholecystitis with cholelithiasis      Procedure Performed:   Procedure(s):  laparoscopic cholecystectomy      Surgeon(s) and Role:     * SHELLY Villalpando

## 2019-03-20 NOTE — H&P
[address] Patient Status:  [facility]    3/9/1945 MRN [id number]   Location [facility] Attending Matthias Ponce MD   Hosp Day # 0 PCP Jose Carlos Pastrana (two) times daily as needed. acetaminophen 325 MG Oral Tab Take 325 mg by mouth every 6 (six) hours as needed for Pain. Review of Systems:     A comprehensive review of systems was essentially negative, except as noted in HPI.     Physical Exam:   V

## 2019-04-01 ENCOUNTER — OFFICE VISIT (OUTPATIENT)
Dept: INTERNAL MEDICINE CLINIC | Facility: CLINIC | Age: 74
End: 2019-04-01
Payer: MEDICAID

## 2019-04-01 VITALS
HEIGHT: 62 IN | SYSTOLIC BLOOD PRESSURE: 114 MMHG | OXYGEN SATURATION: 99 % | HEART RATE: 65 BPM | DIASTOLIC BLOOD PRESSURE: 70 MMHG | BODY MASS INDEX: 28.52 KG/M2 | WEIGHT: 155 LBS

## 2019-04-01 DIAGNOSIS — Z13.820 SCREENING FOR OSTEOPOROSIS: ICD-10-CM

## 2019-04-01 DIAGNOSIS — Z90.49 STATUS POST LAPAROSCOPIC CHOLECYSTECTOMY: Primary | ICD-10-CM

## 2019-04-01 DIAGNOSIS — Z12.31 SCREENING MAMMOGRAM, ENCOUNTER FOR: ICD-10-CM

## 2019-04-01 PROCEDURE — 99214 OFFICE O/P EST MOD 30 MIN: CPT | Performed by: INTERNAL MEDICINE

## 2019-04-01 RX ORDER — DONEPEZIL HYDROCHLORIDE 10 MG/1
10 TABLET, FILM COATED ORAL NIGHTLY
Qty: 90 TABLET | Refills: 3 | Status: SHIPPED | OUTPATIENT
Start: 2019-04-01 | End: 2020-08-10

## 2019-04-01 RX ORDER — SIMVASTATIN 20 MG
20 TABLET ORAL NIGHTLY
Qty: 30 TABLET | Refills: 11 | Status: SHIPPED | OUTPATIENT
Start: 2019-04-01 | End: 2020-08-08

## 2019-04-01 NOTE — PROGRESS NOTES
HPI:    Patient ID: Davida Cantu is a 76year old female. Pt here a fu on lap cholecystectomy- for severe chronic cholecystitis. Is feeling quite well. Has minimal incisional pain. Had surgery on 3/23/19. Is on no pain.     Review of Systems   Resp (CPT=77067)  XR DEXA BONE DENSITOMETRY (CPT=77080)         TIME COMPONENT:    Kirsty Douglas MD  4/1/2019

## 2019-09-16 NOTE — PROGRESS NOTES
HPI:    Patient ID: Doroteo Kemp is a 76year old female. HPI Patient here for a fu on senile dementia and lipids. Is generally feeling quite well. Has some socialization with friends. No new complaints. Has less abdominal complaints.     Review o disturbance (hcc)  (primary encounter diagnosis) On aricept  CPM  Mixed hyperlipidemia on zocor - check labs  Other irritable bowel syndrome  High fiber diet    No orders of the defined types were placed in this encounter.       Meds This Visit:  Requested

## 2019-09-21 ENCOUNTER — APPOINTMENT (OUTPATIENT)
Dept: LAB | Age: 74
End: 2019-09-21
Attending: INTERNAL MEDICINE
Payer: MEDICAID

## 2019-09-21 DIAGNOSIS — E78.2 MIXED HYPERLIPIDEMIA: ICD-10-CM

## 2019-09-21 LAB
ALBUMIN SERPL-MCNC: 4 G/DL (ref 3.4–5)
ALBUMIN/GLOB SERPL: 1.2 {RATIO} (ref 1–2)
ALP LIVER SERPL-CCNC: 81 U/L (ref 55–142)
ALT SERPL-CCNC: 17 U/L (ref 13–56)
ANION GAP SERPL CALC-SCNC: 5 MMOL/L (ref 0–18)
AST SERPL-CCNC: 16 U/L (ref 15–37)
BILIRUB SERPL-MCNC: 0.6 MG/DL (ref 0.1–2)
BUN BLD-MCNC: 15 MG/DL (ref 7–18)
BUN/CREAT SERPL: 18.3 (ref 10–20)
CALCIUM BLD-MCNC: 9.5 MG/DL (ref 8.5–10.1)
CHLORIDE SERPL-SCNC: 109 MMOL/L (ref 98–112)
CHOLEST SMN-MCNC: 225 MG/DL (ref ?–200)
CO2 SERPL-SCNC: 27 MMOL/L (ref 21–32)
CREAT BLD-MCNC: 0.82 MG/DL (ref 0.55–1.02)
GLOBULIN PLAS-MCNC: 3.4 G/DL (ref 2.8–4.4)
GLUCOSE BLD-MCNC: 96 MG/DL (ref 70–99)
HDLC SERPL-MCNC: 68 MG/DL (ref 40–59)
LDLC SERPL CALC-MCNC: 139 MG/DL (ref ?–100)
M PROTEIN MFR SERPL ELPH: 7.4 G/DL (ref 6.4–8.2)
NONHDLC SERPL-MCNC: 157 MG/DL (ref ?–130)
OSMOLALITY SERPL CALC.SUM OF ELEC: 293 MOSM/KG (ref 275–295)
PATIENT FASTING: YES
PATIENT FASTING: YES
POTASSIUM SERPL-SCNC: 4 MMOL/L (ref 3.5–5.1)
SODIUM SERPL-SCNC: 141 MMOL/L (ref 136–145)
TRIGL SERPL-MCNC: 90 MG/DL (ref 30–149)
VLDLC SERPL CALC-MCNC: 18 MG/DL (ref 0–30)

## 2019-09-21 PROCEDURE — 80061 LIPID PANEL: CPT

## 2019-09-21 PROCEDURE — 36415 COLL VENOUS BLD VENIPUNCTURE: CPT

## 2019-09-21 PROCEDURE — 80053 COMPREHEN METABOLIC PANEL: CPT

## 2020-08-08 RX ORDER — SIMVASTATIN 20 MG
TABLET ORAL
Qty: 30 TABLET | Refills: 0 | Status: SHIPPED | OUTPATIENT
Start: 2020-08-08 | End: 2020-08-10

## 2020-08-10 PROCEDURE — 80053 COMPREHEN METABOLIC PANEL: CPT | Performed by: INTERNAL MEDICINE

## 2020-08-10 PROCEDURE — 80061 LIPID PANEL: CPT | Performed by: INTERNAL MEDICINE

## 2020-08-10 PROCEDURE — 82306 VITAMIN D 25 HYDROXY: CPT | Performed by: INTERNAL MEDICINE

## 2020-08-10 PROCEDURE — 85025 COMPLETE CBC W/AUTO DIFF WBC: CPT | Performed by: INTERNAL MEDICINE

## 2020-08-10 RX ORDER — DONEPEZIL HYDROCHLORIDE 10 MG/1
TABLET, FILM COATED ORAL
Qty: 90 TABLET | Refills: 0 | Status: SHIPPED | OUTPATIENT
Start: 2020-08-10 | End: 2020-08-10

## 2020-08-10 NOTE — PROGRESS NOTES
HPI:    Patient ID: Rudolph Pastor is a 76year old female. HPI Pt here for yearly check up and fu on lipids and mild dementia. Has some complaints of burning on the bottom of the soles. Is very hard of hearing but cant afford aids at this time.   Had a (tenderness at the balls of both feet) present. No edema. Neurological:   Forgetful no agitation exam limited by language barrier   Skin: No rash noted. Psychiatric: She has a normal mood and affect.  Her behavior is normal.              ASSESSMENT/PLAN

## 2020-08-12 ENCOUNTER — OFFICE VISIT (OUTPATIENT)
Dept: OTOLARYNGOLOGY | Facility: CLINIC | Age: 75
End: 2020-08-12
Payer: MEDICAID

## 2020-08-12 ENCOUNTER — OFFICE VISIT (OUTPATIENT)
Dept: AUDIOLOGY | Facility: CLINIC | Age: 75
End: 2020-08-12
Payer: MEDICAID

## 2020-08-12 VITALS
DIASTOLIC BLOOD PRESSURE: 75 MMHG | BODY MASS INDEX: 29.26 KG/M2 | WEIGHT: 159 LBS | SYSTOLIC BLOOD PRESSURE: 120 MMHG | HEIGHT: 62 IN | TEMPERATURE: 98 F

## 2020-08-12 DIAGNOSIS — H93.8X3 IRRITATION OF BOTH EARS: ICD-10-CM

## 2020-08-12 DIAGNOSIS — H91.13 PRESBYCUSIS OF BOTH EARS: Primary | ICD-10-CM

## 2020-08-12 DIAGNOSIS — H90.3 SENSORINEURAL HEARING LOSS, BILATERAL: Primary | ICD-10-CM

## 2020-08-12 DIAGNOSIS — H92.01 REFERRED OTALGIA OF RIGHT EAR: ICD-10-CM

## 2020-08-12 DIAGNOSIS — H69.83 DYSFUNCTION OF BOTH EUSTACHIAN TUBES: ICD-10-CM

## 2020-08-12 PROCEDURE — 92557 COMPREHENSIVE HEARING TEST: CPT | Performed by: AUDIOLOGIST

## 2020-08-12 PROCEDURE — 3008F BODY MASS INDEX DOCD: CPT | Performed by: OTOLARYNGOLOGY

## 2020-08-12 PROCEDURE — 92567 TYMPANOMETRY: CPT | Performed by: AUDIOLOGIST

## 2020-08-12 PROCEDURE — 99243 OFF/OP CNSLTJ NEW/EST LOW 30: CPT | Performed by: OTOLARYNGOLOGY

## 2020-08-12 PROCEDURE — 3074F SYST BP LT 130 MM HG: CPT | Performed by: OTOLARYNGOLOGY

## 2020-08-12 PROCEDURE — 3078F DIAST BP <80 MM HG: CPT | Performed by: OTOLARYNGOLOGY

## 2020-08-12 RX ORDER — FLUTICASONE PROPIONATE 50 MCG
2 SPRAY, SUSPENSION (ML) NASAL DAILY
Qty: 1 BOTTLE | Refills: 11 | Status: SHIPPED | OUTPATIENT
Start: 2020-08-12 | End: 2020-09-11

## 2020-08-12 RX ORDER — FLUOCINOLONE ACETONIDE 0.11 MG/ML
3 OIL AURICULAR (OTIC) 3 TIMES DAILY
Qty: 1 BOTTLE | Refills: 0 | Status: SHIPPED | OUTPATIENT
Start: 2020-08-12 | End: 2020-08-19

## 2020-08-12 NOTE — PROGRESS NOTES
Nevaeh Nagy is a 76year old female.  Patient presents with:  Hearing Loss: decreased hearing in both ears for a while      HISTORY OF PRESENT ILLNESS  8/12/2020  Patient  presents with ear pain, bothersome popping and chronic itching in the bilateral e Concerns:        Caffeine Concern: Not Asked        Exercise: Not Asked        Seat Belt: Not Asked        Special Diet: Not Asked        Stress Concern: Not Asked        Weight Concern: Not Asked    Social History Narrative      Not on file      Family Hi Normal Inspection - Normal. No suspicious lesions bruises or masses.    Constitutional Normal Overall appearance - Normal.   Head/Face abNormal Facial features - Normal. Eyebrows - Normal. Skull - Normal.  Recent musculoskeletal tension noted around right e Donepezil HCl 10 MG Oral Tab, Take 1 tablet (10 mg total) by mouth nightly., Disp: 90 tablet, Rfl: 3  •  simvastatin 20 MG Oral Tab, Take 1 tablet (20 mg total) by mouth nightly., Disp: 90 tablet, Rfl: 3  •  aspirin 81 MG Oral Tab, Take 81 mg by mouth josh functioning eustachian tubes however she is quite bothered by some of the nasal congestion she has and the popping when she blows her nose so I did recommend a trial of Brianne Coley MD    8/12/2020    2:18 PM

## 2020-08-12 NOTE — PROGRESS NOTES
AUDIOGRAM     Rubia Reynoso was referred for testing by Radha Mcgrath for decreased hearing in both ears  3/9/1945  [id number]      Otoscopic inspection: right ear no cerumen; left ear no cerumen.        Tests/Procedures  Patient was tested v

## 2020-08-12 NOTE — PATIENT INSTRUCTIONS
How Hearing Aids Can Help You     Losing your hearing can be frustrating. But hearing aids can help you hear what Paddy Torres been missing. Not everyone who has hearing loss needs hearing aids.  Hearing aids will most likely help you if your hearing loss:  · K © 9578-0828 [employer] [address]. [address] [facility], [facility] [place]. All rights reserved. This information is not intended as a substitute for professional medical care. Always follow your healthcare professional's instructions.

## 2020-09-04 ENCOUNTER — TELEPHONE (OUTPATIENT)
Dept: OTOLARYNGOLOGY | Facility: CLINIC | Age: 75
End: 2020-09-04

## 2020-09-04 NOTE — TELEPHONE ENCOUNTER
Current Outpatient Medications   Medication Sig Dispense Refill      12 capsule 3   • Fluticasone Propionate 50 MCG/ACT Nasal Suspension 2 sprays by Nasal route daily.  1 Bottle 11       Prior Auth received 9/4/20    [place]#[id number]    LOV - 8/12/20

## 2020-11-18 NOTE — TELEPHONE ENCOUNTER
Subjective    Lissa Ortiz is a 79 y.o. female. she is here today for follow-up.    Chief Complaint   Patient presents with   • Diabetes     f/u type 2 diabetes,  recent labs, checks BS once a day,  eye exam 11/2020   • Hypertension   • Hyperlipidemia       History of Present Illness  Encounter Diagnoses   Name Primary?   • Type 2 diabetes mellitus with hyperglycemia, without long-term current use of insulin (CMS/HCC) Yes   • Diabetic peripheral neuropathy (CMS/HCC)    • Mixed hyperlipidemia    • Vitamin D deficiency    • Benign essential hypertension    79-year-old female patient here today for follow-up visit.  She has been seen for the above-mentioned problems.  She had recent labs which were reviewed.  Her A1c has improved.  She is on 15 units of insulin her blood sugars are typically less than 120 mg/dL in the morning.  Her lowest blood glucose reading has been in the 70 range according to her report.  She is up-to-date on eye exam.  She denies any neuropathy in her feet.  She is taking her medications as prescribed.  She is want to come off some of her medications and she has been advised that she can decrease taking some of her supplements that she takes including cinnamon    The following portions of the patient's history were reviewed and updated as appropriate:   Past Medical History:   Diagnosis Date   • Allergic rhinitis    • Arthritis    • Cancer (CMS/HCC)     skin leg   • Cataract    • Diabetes mellitus (CMS/HCC)     type 2   • Hypertension    • Long sleeper     post anesthesia     Past Surgical History:   Procedure Laterality Date   • COLONOSCOPY N/A 2/13/2018    Procedure: COLONOSCOPY to cecum and TI with hot snare polypectomy;  Surgeon: Rowan Smith MD;  Location: [facility] ENDOSCOPY;  Service:    • EYE SURGERY      tearduct sgy   • EYE SURGERY      MARY KAY CATARACTS   • HYSTERECTOMY     • TONSILLECTOMY       Family History   Problem Relation Age of Onset   • Diabetes Mother    • Coronary artery  Referral entered disease Father         MI   • Diabetes Father    • Stroke Father      OB History    No obstetric history on file.      Obstetric Comments   GYN- DR Case           Current Outpatient Medications   Medication Sig Dispense Refill   • amLODIPine (NORVASC) 5 MG tablet TAKE ONE TABLET BY MOUTH DAILY 90 tablet 1   • azelastine (ASTELIN) 0.1 % nasal spray 2 sprays into the nostril(s) as directed by provider 2 (Two) Times a Day. Use in each nostril as directed 2 each 5   • Biotin 10 MG capsule Take  by mouth.     • Blood Glucose Monitoring Suppl (TRUE METRIX AIR GLUCOSE METER) device 1 Device Daily As Needed (check bs's twice daily). 1 Device 0   • cholecalciferol (VITAMIN D3) 1000 units tablet Take 1,000 Units by mouth Every Other Day.     • Cinnamon 500 MG capsule 500 mg 2 (Two) Times a Day.     • COMBIGAN 0.2-0.5 % ophthalmic solution      • folic acid (FOLVITE) 1 MG tablet Take 1 tablet by mouth daily.     • gabapentin (Neurontin) 100 MG capsule Start with 1 pill at bedtime may take 1 po tid 90 capsule 2   • Insulin Glargine, 2 Unit Dial, (Toujeo Max SoloStar) 300 UNIT/ML solution pen-injector injection      • Insulin Pen Needle (BD Pen Needle Vicenta U/F) 32G X 4 MM misc Using 1 pen needle daily 100 each 1   • JANUMET  MG per tablet TAKE ONE TABLET BY MOUTH TWICE A DAY WITH MEALS 180 tablet 10   • JARDIANCE 25 MG tablet Take 25 mg by mouth Daily With Breakfast. 30 tablet 11   • levothyroxine (SYNTHROID, LEVOTHROID) 25 MCG tablet Take 1 tablet by mouth Daily. 30 tablet 5   • Methotrexate Sodium 50 MG/2ML injection Inject 7 mg into the appropriate muscle as directed by prescriber 1 (One) Time Per Week.     • Multiple Vitamin (MULTI VITAMIN PO) Take 1 tablet by mouth Daily.     • Omega 3 1000 MG capsule Take 1 capsule by mouth.     • pravastatin (PRAVACHOL) 20 MG tablet TAKE ONE TABLET BY MOUTH DAILY 90 tablet 3   • Probiotic capsule Take 1 capsule by mouth.     • rOPINIRole (Requip) 0.25 MG tablet Take 1 tablet by  "mouth Every Night. Take 1 hour before bedtime. 30 tablet 2   • TRAVATAN Z 0.004 % solution ophthalmic solution Administer 1 drop to both eyes Every Night.     • vitamin B-12 (CYANOCOBALAMIN) 100 MCG tablet Take 1 tablet by mouth Every Other Day.       No current facility-administered medications for this visit.      Allergies   Allergen Reactions   • Sulfamethoxazole Nausea Only   • Trimethoprim Nausea Only   • Bactrim [Sulfamethoxazole-Trimethoprim] Nausea And Vomiting   • Fluticasone Furoate Unknown (See Comments)   • Vilanterol Unknown (See Comments)     Social History     Socioeconomic History   • Marital status:      Spouse name: Not on file   • Number of children: 3   • Years of education: Not on file   • Highest education level: Not on file   Tobacco Use   • Smoking status: Never Smoker   • Smokeless tobacco: Never Used   Substance and Sexual Activity   • Alcohol use: No     Comment: Occasional glass of wine one per month   • Drug use: No   Lifestyle   • Physical activity     Days per week: 6 days     Minutes per session: 60 min   • Stress: Not on file       Review of Systems  Review of Systems   Constitutional: Positive for fatigue.   Cardiovascular: Negative.    Gastrointestinal: Negative.    Endocrine: Negative.    Neurological: Negative.    Psychiatric/Behavioral: Negative for sleep disturbance.        Objective    /62   Ht 170.2 cm (67\")   Wt 64.9 kg (143 lb)   LMP  (LMP Unknown)   BMI 22.40 kg/m²     Physical Exam  Vitals signs and nursing note reviewed.   Constitutional:       General: She is not in acute distress.     Appearance: Normal appearance. She is well-developed and normal weight. She is not diaphoretic.   HENT:      Head: Normocephalic and atraumatic.      Right Ear: External ear normal.      Left Ear: External ear normal.      Nose: Nose normal.      Mouth/Throat:      Pharynx: No oropharyngeal exudate.   Eyes:      General:         Right eye: No discharge.         Left eye: " No discharge.      Pupils: Pupils are equal, round, and reactive to light.   Neck:      Musculoskeletal: Full passive range of motion without pain, normal range of motion and neck supple. No edema or erythema.      Thyroid: No thyroid mass or thyromegaly.      Vascular: No carotid bruit.      Trachea: Trachea normal. No tracheal tenderness or tracheal deviation.   Cardiovascular:      Rate and Rhythm: Normal rate and regular rhythm.      Heart sounds: Normal heart sounds. No murmur. No friction rub. No gallop.    Pulmonary:      Effort: Pulmonary effort is normal. No respiratory distress.      Breath sounds: Normal breath sounds. No stridor. No wheezing or rales.   Abdominal:      General: Bowel sounds are normal. There is no distension.      Palpations: Abdomen is soft.   Musculoskeletal: Normal range of motion.         General: No deformity.   Lymphadenopathy:      Cervical: No cervical adenopathy.   Skin:     General: Skin is warm and dry.      Coloration: Skin is not pale.      Findings: No erythema or rash.   Neurological:      Mental Status: She is alert and oriented to person, place, and time.   Psychiatric:         Behavior: Behavior normal.         Thought Content: Thought content normal.         Judgment: Judgment normal.         Lab Review  Sodium (mmol/L)   Date Value   11/05/2020 139   05/05/2020 137   02/12/2020 139     Potassium (mmol/L)   Date Value   11/05/2020 4.7   05/05/2020 4.3   02/12/2020 4.2     Chloride (mmol/L)   Date Value   11/05/2020 101   05/05/2020 99   02/12/2020 102     Total CO2 (mmol/L)   Date Value   11/05/2020 27.5   05/05/2020 28.3   02/12/2020 26.2     BUN (mg/dL)   Date Value   11/05/2020 13   05/05/2020 14   02/12/2020 18     Creatinine (mg/dL)   Date Value   11/05/2020 0.62   05/05/2020 0.66   02/12/2020 0.70     Hemoglobin A1C (%)   Date Value   11/05/2020 7.43 (H)   05/05/2020 8.40 (H)   02/12/2020 8.20 (H)     Triglycerides (mg/dL)   Date Value   11/05/2020 57    05/05/2020 57   12/24/2019 71     LDL Cholesterol  (mg/dL)   Date Value   05/05/2020 81   12/24/2019 98   11/05/2019 109 (H)     LDL Chol Calc (NIH) (mg/dL)   Date Value   11/05/2020 87       Assessment/Plan      1. Type 2 diabetes mellitus with hyperglycemia, without long-term current use of insulin (CMS/[facility])    2. Diabetic peripheral neuropathy (CMS/[facility])    3. Mixed hyperlipidemia    4. Vitamin D deficiency    5. Benign essential hypertension    .    Medications prescribed:  Outpatient Encounter Medications as of 11/19/2020   Medication Sig Dispense Refill   • amLODIPine (NORVASC) 5 MG tablet TAKE ONE TABLET BY MOUTH DAILY 90 tablet 1   • azelastine (ASTELIN) 0.1 % nasal spray 2 sprays into the nostril(s) as directed by provider 2 (Two) Times a Day. Use in each nostril as directed 2 each 5   • Biotin 10 MG capsule Take  by mouth.     • Blood Glucose Monitoring Suppl (TRUE METRIX AIR GLUCOSE METER) device 1 Device Daily As Needed (check bs's twice daily). 1 Device 0   • cholecalciferol (VITAMIN D3) 1000 units tablet Take 1,000 Units by mouth Every Other Day.     • Cinnamon 500 MG capsule 500 mg 2 (Two) Times a Day.     • COMBIGAN 0.2-0.5 % ophthalmic solution      • folic acid (FOLVITE) 1 MG tablet Take 1 tablet by mouth daily.     • gabapentin (Neurontin) 100 MG capsule Start with 1 pill at bedtime may take 1 po tid 90 capsule 2   • Insulin Glargine, 2 Unit Dial, (Toujeo Max SoloStar) 300 UNIT/ML solution pen-injector injection      • Insulin Pen Needle (BD Pen Needle Vicenta U/F) 32G X 4 MM misc Using 1 pen needle daily 100 each 1   • JANUMET  MG per tablet TAKE ONE TABLET BY MOUTH TWICE A DAY WITH MEALS 180 tablet 10   • JARDIANCE 25 MG tablet Take 25 mg by mouth Daily With Breakfast. 30 tablet 11   • levothyroxine (SYNTHROID, LEVOTHROID) 25 MCG tablet Take 1 tablet by mouth Daily. 30 tablet 5   • Methotrexate Sodium 50 MG/2ML injection Inject 7 mg into the appropriate muscle as directed by prescriber 1  (One) Time Per Week.     • Multiple Vitamin (MULTI VITAMIN PO) Take 1 tablet by mouth Daily.     • Omega 3 1000 MG capsule Take 1 capsule by mouth.     • pravastatin (PRAVACHOL) 20 MG tablet TAKE ONE TABLET BY MOUTH DAILY 90 tablet 3   • Probiotic capsule Take 1 capsule by mouth.     • rOPINIRole (Requip) 0.25 MG tablet Take 1 tablet by mouth Every Night. Take 1 hour before bedtime. 30 tablet 2   • TRAVATAN Z 0.004 % solution ophthalmic solution Administer 1 drop to both eyes Every Night.     • vitamin B-12 (CYANOCOBALAMIN) 100 MCG tablet Take 1 tablet by mouth Every Other Day.       No facility-administered encounter medications on file as of 11/19/2020.        Orders placed during this encounter include:  No orders of the defined types were placed in this encounter.      In Summary patient was seen and evaluated.  Metabolically and clinically she presents stable.  Her A1c has improved with the addition of insulin.  She is on low-dose thyroid hormone we will continue this medication.  Her TSH now is in normal range.  Her weight is stable.  She has not had any hypoglycemic events.  Blood pressures in satisfactory range.  No medications were added or changed.  She will follow-up her next visit with the endocrinologist with labs prior.  She has been encouraged to reach out should she have any concerns prior to her next visit.

## 2021-01-07 NOTE — PATIENT INSTRUCTIONS
1. Schedule colonoscopy and EGD on the same day with split dose Miralax preparation and MAC at [facility] No distress    Vital Signs Last 24 Hrs  T(C): 36.6 (01-07-21 @ 08:31), Max: 36.7 (01-06-21 @ 21:17)  T(F): 97.8 (01-07-21 @ 08:31), Max: 98.1 (01-06-21 @ 21:17)  HR: 66 (01-07-21 @ 08:31) (66 - 82)  BP: 97/61 (01-07-21 @ 08:31) (97/61 - 123/78)  RR: 14 (01-07-21 @ 08:31) (14 - 15)  SpO2: 99% (01-07-21 @ 08:31) (97% - 100%)    card s1s2  b/l air entry  soft, ND  tr edema                                                                                  8.9    6.18  )-----------( 133      ( 07 Jan 2021 05:30 )             27.9     07 Jan 2021 05:30    135    |  101    |  26     ----------------------------<  86     4.6     |  27     |  1.58     Ca    8.1        07 Jan 2021 05:30    TPro  5.0    /  Alb  2.5    /  TBili  0.4    /  DBili  x      /  AST  31     /  ALT  79     /  AlkPhos  76     07 Jan 2021 05:30    LIVER FUNCTIONS - ( 07 Jan 2021 05:30 )  Alb: 2.5 g/dL / Pro: 5.0 g/dL / ALK PHOS: 76 U/L / ALT: 79 U/L / AST: 31 U/L / GGT: x           aMIOdarone    Tablet 200 milliGRAM(s) Oral daily  apixaban 5 milliGRAM(s) Oral two times a day  AQUAPHOR (petrolatum Ointment) 1 Application(s) Topical two times a day  atorvastatin 10 milliGRAM(s) Oral at bedtime  BACItracin   Ointment 1 Application(s) Topical daily  clidinium/chlordiazepoxide 1 Capsule(s) Oral two times a day PRN  clotrimazole Lozenge 1 Lozenge Oral <User Schedule>  cyanocobalamin 1000 MICROGram(s) Oral daily  dextrose 50% Injectable 25 milliLiter(s) IV Push every 15 minutes PRN  folic acid 1 milliGRAM(s) Oral daily  lactobacillus acidophilus 1 Tablet(s) Oral daily  levETIRAcetam 500 milliGRAM(s) Oral two times a day  metoprolol succinate ER 25 milliGRAM(s) Oral daily  midodrine. 2.5 milliGRAM(s) Oral <User Schedule>  multivitamin 1 Tablet(s) Oral daily  nystatin Powder 1 Application(s) Topical two times a day  polyethylene glycol 3350 17 Gram(s) Oral daily PRN  predniSONE   Tablet 20 milliGRAM(s) Oral daily  tamsulosin 0.4 milliGRAM(s) Oral at bedtime  trimethoprim  160 mG/sulfamethoxazole 800 mG 2 Tablet(s) Oral two times a day    A/P:    Complicated hospital course as per HPI  S/p GLENDA on CKD (Cr 1.26 - 12/31/20)  Avoid nephrotoxins  F/u BMP  ABx per ID  Cr is fluctuating but fairly stable  Will follow    [phone]

## 2021-03-08 DIAGNOSIS — Z23 NEED FOR VACCINATION: ICD-10-CM

## 2021-08-13 ENCOUNTER — HOSPITAL ENCOUNTER (OUTPATIENT)
Dept: GENERAL RADIOLOGY | Age: 76
Discharge: HOME | End: 2021-08-13
Attending: INTERNAL MEDICINE
Payer: MEDICAID

## 2021-08-13 ENCOUNTER — OFFICE VISIT (OUTPATIENT)
Dept: INTERNAL MEDICINE CLINIC | Facility: CLINIC | Age: 76
End: 2021-08-13
Payer: MEDICAID

## 2021-08-13 VITALS
WEIGHT: 158.81 LBS | DIASTOLIC BLOOD PRESSURE: 72 MMHG | BODY MASS INDEX: 29.22 KG/M2 | HEIGHT: 62 IN | SYSTOLIC BLOOD PRESSURE: 106 MMHG

## 2021-08-13 DIAGNOSIS — E55.9 VITAMIN D DEFICIENCY: ICD-10-CM

## 2021-08-13 DIAGNOSIS — M72.2 PLANTAR FASCIITIS, BILATERAL: ICD-10-CM

## 2021-08-13 DIAGNOSIS — F01.50 VASCULAR DEMENTIA WITHOUT BEHAVIORAL DISTURBANCE (HCC): ICD-10-CM

## 2021-08-13 DIAGNOSIS — E78.2 MIXED HYPERLIPIDEMIA: ICD-10-CM

## 2021-08-13 DIAGNOSIS — Z00.00 WELLNESS EXAMINATION: Primary | ICD-10-CM

## 2021-08-13 LAB
ALBUMIN SERPL-MCNC: 4.1 G/DL (ref 3.4–5)
ALBUMIN/GLOB SERPL: 1.1 {RATIO} (ref 1–2)
ALP LIVER SERPL-CCNC: 91 U/L
ALT SERPL-CCNC: 21 U/L
ANION GAP SERPL CALC-SCNC: 5 MMOL/L (ref 0–18)
AST SERPL-CCNC: 14 U/L (ref 15–37)
BASOPHILS # BLD AUTO: 0.05 X10(3) UL (ref 0–0.2)
BASOPHILS NFR BLD AUTO: 0.9 %
BILIRUB SERPL-MCNC: 0.6 MG/DL (ref 0.1–2)
BUN BLD-MCNC: 13 MG/DL (ref 7–18)
BUN/CREAT SERPL: 16.7 (ref 10–20)
CALCIUM BLD-MCNC: 9.8 MG/DL (ref 8.5–10.1)
CHLORIDE SERPL-SCNC: 109 MMOL/L (ref 98–112)
CHOLEST SMN-MCNC: 242 MG/DL (ref ?–200)
CO2 SERPL-SCNC: 26 MMOL/L (ref 21–32)
CREAT BLD-MCNC: 0.78 MG/DL
DEPRECATED RDW RBC AUTO: 47.8 FL (ref 35.1–46.3)
EOSINOPHIL # BLD AUTO: 0.11 X10(3) UL (ref 0–0.7)
EOSINOPHIL NFR BLD AUTO: 2.1 %
ERYTHROCYTE [DISTWIDTH] IN BLOOD BY AUTOMATED COUNT: 13.4 % (ref 11–15)
GLOBULIN PLAS-MCNC: 3.7 G/DL (ref 2.8–4.4)
GLUCOSE BLD-MCNC: 92 MG/DL (ref 70–99)
HCT VFR BLD AUTO: 40 %
HDLC SERPL-MCNC: 66 MG/DL (ref 40–59)
HGB BLD-MCNC: 13.1 G/DL
IMM GRANULOCYTES # BLD AUTO: 0.01 X10(3) UL (ref 0–1)
IMM GRANULOCYTES NFR BLD: 0.2 %
LDLC SERPL CALC-MCNC: 152 MG/DL (ref ?–100)
LYMPHOCYTES # BLD AUTO: 2.02 X10(3) UL (ref 1–4)
LYMPHOCYTES NFR BLD AUTO: 37.8 %
M PROTEIN MFR SERPL ELPH: 7.8 G/DL (ref 6.4–8.2)
MCH RBC QN AUTO: 31.5 PG (ref 26–34)
MCHC RBC AUTO-ENTMCNC: 32.8 G/DL (ref 31–37)
MCV RBC AUTO: 96.2 FL
MONOCYTES # BLD AUTO: 0.41 X10(3) UL (ref 0.1–1)
MONOCYTES NFR BLD AUTO: 7.7 %
NEUTROPHILS # BLD AUTO: 2.75 X10 (3) UL (ref 1.5–7.7)
NEUTROPHILS # BLD AUTO: 2.75 X10(3) UL (ref 1.5–7.7)
NEUTROPHILS NFR BLD AUTO: 51.3 %
NONHDLC SERPL-MCNC: 176 MG/DL (ref ?–130)
OSMOLALITY SERPL CALC.SUM OF ELEC: 290 MOSM/KG (ref 275–295)
PATIENT FASTING Y/N/NP: YES
PATIENT FASTING Y/N/NP: YES
PLATELET # BLD AUTO: 249 10(3)UL (ref 150–450)
POTASSIUM SERPL-SCNC: 4.2 MMOL/L (ref 3.5–5.1)
RBC # BLD AUTO: 4.16 X10(6)UL
SODIUM SERPL-SCNC: 140 MMOL/L (ref 136–145)
TRIGL SERPL-MCNC: 135 MG/DL (ref 30–149)
VIT D+METAB SERPL-MCNC: 33.6 NG/ML (ref 30–100)
VLDLC SERPL CALC-MCNC: 26 MG/DL (ref 0–30)
WBC # BLD AUTO: 5.4 X10(3) UL (ref 4–11)

## 2021-08-13 PROCEDURE — 3078F DIAST BP <80 MM HG: CPT | Performed by: INTERNAL MEDICINE

## 2021-08-13 PROCEDURE — 73630 X-RAY EXAM OF FOOT: CPT | Performed by: INTERNAL MEDICINE

## 2021-08-13 PROCEDURE — 80053 COMPREHEN METABOLIC PANEL: CPT | Performed by: INTERNAL MEDICINE

## 2021-08-13 PROCEDURE — 99397 PER PM REEVAL EST PAT 65+ YR: CPT | Performed by: INTERNAL MEDICINE

## 2021-08-13 PROCEDURE — 80061 LIPID PANEL: CPT | Performed by: INTERNAL MEDICINE

## 2021-08-13 PROCEDURE — 85025 COMPLETE CBC W/AUTO DIFF WBC: CPT | Performed by: INTERNAL MEDICINE

## 2021-08-13 PROCEDURE — 3008F BODY MASS INDEX DOCD: CPT | Performed by: INTERNAL MEDICINE

## 2021-08-13 PROCEDURE — 3074F SYST BP LT 130 MM HG: CPT | Performed by: INTERNAL MEDICINE

## 2021-08-13 PROCEDURE — 82306 VITAMIN D 25 HYDROXY: CPT | Performed by: INTERNAL MEDICINE

## 2021-08-13 RX ORDER — ERGOCALCIFEROL 1.25 MG/1
50000 CAPSULE ORAL WEEKLY
Qty: 12 CAPSULE | Refills: 3 | Status: SHIPPED | OUTPATIENT
Start: 2021-08-13

## 2021-08-13 RX ORDER — ERGOCALCIFEROL 1.25 MG/1
CAPSULE ORAL
COMMUNITY
Start: 2021-04-19 | End: 2021-08-13

## 2021-08-13 NOTE — PROGRESS NOTES
HPI/Subjective:   Patient ID: Jocelyn Gilbert is a 68year old female. HPI Pt here for an annual examination and fu on dementia, vit d def and hyperlidpeimia. Has new complaints of pain in the soles of her feet.   Describes it as a burning sensation  Hi normal.      Breath sounds: Normal breath sounds. Abdominal:      Palpations: Abdomen is soft. There is no mass. Tenderness: There is no abdominal tenderness.    Genitourinary:     Comments: Breasts no masses except for lipome in right upper quadrant

## 2021-08-17 RX ORDER — DONEPEZIL HYDROCHLORIDE 10 MG/1
10 TABLET, FILM COATED ORAL NIGHTLY
Qty: 90 TABLET | Refills: 0 | Status: SHIPPED | OUTPATIENT
Start: 2021-08-17

## 2021-08-17 NOTE — TELEPHONE ENCOUNTER
Requested Prescriptions     Pending Prescriptions Disp Refills   • DONEPEZIL 10 MG Oral Tab [Pharmacy Med Name: Donepezil Hcl 10 Mg Tab [facility]] 90 tablet 0     Sig: Take 1 tablet (10 mg total) by mouth nightly.      Last office visit: 8-13-21  Medication last

## 2021-11-02 ENCOUNTER — OFFICE VISIT (OUTPATIENT)
Dept: INTERNAL MEDICINE CLINIC | Facility: CLINIC | Age: 76
End: 2021-11-02
Payer: MEDICAID

## 2021-11-02 VITALS
WEIGHT: 159 LBS | SYSTOLIC BLOOD PRESSURE: 120 MMHG | DIASTOLIC BLOOD PRESSURE: 60 MMHG | BODY MASS INDEX: 29.26 KG/M2 | OXYGEN SATURATION: 96 % | HEART RATE: 73 BPM | HEIGHT: 62 IN

## 2021-11-02 DIAGNOSIS — G60.9 IDIOPATHIC PERIPHERAL NEUROPATHY: Primary | ICD-10-CM

## 2021-11-02 DIAGNOSIS — Z91.09 ENVIRONMENTAL ALLERGIES: ICD-10-CM

## 2021-11-02 PROCEDURE — 99214 OFFICE O/P EST MOD 30 MIN: CPT | Performed by: INTERNAL MEDICINE

## 2021-11-02 PROCEDURE — 3008F BODY MASS INDEX DOCD: CPT | Performed by: INTERNAL MEDICINE

## 2021-11-02 PROCEDURE — 3078F DIAST BP <80 MM HG: CPT | Performed by: INTERNAL MEDICINE

## 2021-11-02 PROCEDURE — 3074F SYST BP LT 130 MM HG: CPT | Performed by: INTERNAL MEDICINE

## 2021-11-02 RX ORDER — GABAPENTIN 100 MG/1
100 CAPSULE ORAL NIGHTLY
Qty: 30 CAPSULE | Refills: 1 | Status: SHIPPED | OUTPATIENT
Start: 2021-11-02

## 2021-11-02 NOTE — PROGRESS NOTES
Subjective:   Patient ID: Twin Rosen is a 68year old female. Foot Pain   Associated symptoms include numbness (soles of feet). Allergies  Associated symptoms include numbness (soles of feet).    Patient here for evaluation of bilateral tingling f tenderness. Musculoskeletal:         General: Tenderness (pretibial pain) present. Right lower leg: No edema. Left lower leg: No edema. Lymphadenopathy:      Cervical: No cervical adenopathy. Neurological:      Mental Status: She is alert.

## 2022-03-22 RX ORDER — DONEPEZIL HYDROCHLORIDE 10 MG/1
10 TABLET, FILM COATED ORAL NIGHTLY
Qty: 90 TABLET | Refills: 0 | Status: SHIPPED | OUTPATIENT
Start: 2022-03-22

## 2022-03-24 RX ORDER — SIMVASTATIN 20 MG
20 TABLET ORAL NIGHTLY
Qty: 90 TABLET | Refills: 0 | Status: SHIPPED | OUTPATIENT
Start: 2022-03-24

## 2022-06-14 ENCOUNTER — APPOINTMENT (OUTPATIENT)
Dept: GENERAL RADIOLOGY | Age: 77
End: 2022-06-14
Attending: NURSE PRACTITIONER
Payer: MEDICAID

## 2022-06-14 ENCOUNTER — HOSPITAL ENCOUNTER (OUTPATIENT)
Age: 77
Discharge: HOME | End: 2022-06-14
Payer: MEDICAID

## 2022-06-14 VITALS
HEART RATE: 70 BPM | OXYGEN SATURATION: 99 % | SYSTOLIC BLOOD PRESSURE: 148 MMHG | TEMPERATURE: 97 F | DIASTOLIC BLOOD PRESSURE: 73 MMHG | RESPIRATION RATE: 18 BRPM | BODY MASS INDEX: 26.46 KG/M2 | HEIGHT: 64 IN | WEIGHT: 155 LBS

## 2022-06-14 DIAGNOSIS — M54.40 BACK PAIN OF LUMBAR REGION WITH SCIATICA: Primary | ICD-10-CM

## 2022-06-14 LAB
BILIRUB UR QL STRIP: NEGATIVE
CLARITY UR: CLEAR
COLOR UR: YELLOW
GLUCOSE UR STRIP-MCNC: NEGATIVE MG/DL
HGB UR QL STRIP: NEGATIVE
KETONES UR STRIP-MCNC: NEGATIVE MG/DL
LEUKOCYTE ESTERASE UR QL STRIP: NEGATIVE
NITRITE UR QL STRIP: NEGATIVE
PH UR STRIP: 7 [PH]
PROT UR STRIP-MCNC: NEGATIVE MG/DL
SP GR UR STRIP: 1.02
UROBILINOGEN UR STRIP-ACNC: 4 MG/DL

## 2022-06-14 PROCEDURE — 99213 OFFICE O/P EST LOW 20 MIN: CPT | Performed by: NURSE PRACTITIONER

## 2022-06-14 PROCEDURE — 81002 URINALYSIS NONAUTO W/O SCOPE: CPT | Performed by: NURSE PRACTITIONER

## 2022-06-14 PROCEDURE — 72100 X-RAY EXAM L-S SPINE 2/3 VWS: CPT | Performed by: NURSE PRACTITIONER

## 2022-06-14 RX ORDER — LIDOCAINE 50 MG/G
2 PATCH TOPICAL EVERY 24 HOURS
Qty: 28 PATCH | Refills: 0 | Status: SHIPPED | OUTPATIENT
Start: 2022-06-14 | End: 2022-06-28

## 2022-06-14 RX ORDER — KETOROLAC TROMETHAMINE 30 MG/ML
30 INJECTION, SOLUTION INTRAMUSCULAR; INTRAVENOUS ONCE
Status: COMPLETED | OUTPATIENT
Start: 2022-06-14 | End: 2022-06-14

## 2022-06-14 NOTE — ED INITIAL ASSESSMENT (HPI)
Pt reports lower back pain radiating down posterior right leg, 10/10 pain. Pain started Friday, progressively worsening.  Denies injury previous surgery

## 2022-06-17 ENCOUNTER — OFFICE VISIT (OUTPATIENT)
Dept: INTERNAL MEDICINE CLINIC | Facility: CLINIC | Age: 77
End: 2022-06-17
Payer: MEDICAID

## 2022-06-17 VITALS
WEIGHT: 155 LBS | HEIGHT: 64 IN | RESPIRATION RATE: 17 BRPM | OXYGEN SATURATION: 99 % | SYSTOLIC BLOOD PRESSURE: 120 MMHG | BODY MASS INDEX: 26.46 KG/M2 | HEART RATE: 59 BPM | DIASTOLIC BLOOD PRESSURE: 74 MMHG

## 2022-06-17 DIAGNOSIS — M53.3 SACROILIAC PAIN: ICD-10-CM

## 2022-06-17 DIAGNOSIS — M54.50 ACUTE BILATERAL LOW BACK PAIN WITHOUT SCIATICA: Primary | ICD-10-CM

## 2022-06-17 PROBLEM — G30.9 ALZHEIMER'S DISEASE, UNSPECIFIED (HCC): Status: ACTIVE | Noted: 2022-06-17

## 2022-06-17 PROBLEM — F02.80 ALZHEIMER'S DISEASE, UNSPECIFIED (HCC): Status: ACTIVE | Noted: 2022-06-17

## 2022-06-17 PROCEDURE — 3078F DIAST BP <80 MM HG: CPT | Performed by: FAMILY MEDICINE

## 2022-06-17 PROCEDURE — 99214 OFFICE O/P EST MOD 30 MIN: CPT | Performed by: FAMILY MEDICINE

## 2022-06-17 PROCEDURE — 3008F BODY MASS INDEX DOCD: CPT | Performed by: FAMILY MEDICINE

## 2022-06-17 PROCEDURE — 3074F SYST BP LT 130 MM HG: CPT | Performed by: FAMILY MEDICINE

## 2022-06-17 NOTE — ASSESSMENT & PLAN NOTE
Patient with acute lower back pain also some sacroiliac pain. Stretches and exercises provided. Advised ibuprofen 40 mg every 6 hours. Can also take Tylenol as needed. Continue using lidocaine patches. Voltaren gel as needed. Heat as needed. Lumbar roll when sitting. Avoid sitting or lying down for prolonged periods of time. Physical therapy referral provided. Follow-up in 3 months as needed.

## 2022-06-27 RX ORDER — SIMVASTATIN 20 MG
TABLET ORAL
Qty: 90 TABLET | Refills: 0 | Status: SHIPPED | OUTPATIENT
Start: 2022-06-27

## 2022-06-27 RX ORDER — ERGOCALCIFEROL 1.25 MG/1
CAPSULE ORAL
Qty: 12 CAPSULE | Refills: 0 | Status: SHIPPED | OUTPATIENT
Start: 2022-06-27

## 2022-06-27 RX ORDER — DONEPEZIL HYDROCHLORIDE 10 MG/1
10 TABLET, FILM COATED ORAL NIGHTLY
Qty: 90 TABLET | Refills: 0 | Status: SHIPPED | OUTPATIENT
Start: 2022-06-27

## 2022-07-13 ENCOUNTER — TELEPHONE (OUTPATIENT)
Dept: PHYSICAL THERAPY | Facility: HOSPITAL | Age: 77
End: 2022-07-13

## 2022-07-14 ENCOUNTER — TELEPHONE (OUTPATIENT)
Dept: PHYSICAL THERAPY | Facility: HOSPITAL | Age: 77
End: 2022-07-14

## 2022-07-18 ENCOUNTER — OFFICE VISIT (OUTPATIENT)
Dept: PHYSICAL THERAPY | Facility: HOSPITAL | Age: 77
End: 2022-07-18
Attending: FAMILY MEDICINE
Payer: MEDICAID

## 2022-07-18 DIAGNOSIS — M54.50 ACUTE BILATERAL LOW BACK PAIN WITHOUT SCIATICA: ICD-10-CM

## 2022-07-18 DIAGNOSIS — M53.3 SACROILIAC PAIN: ICD-10-CM

## 2022-07-18 PROCEDURE — 97161 PT EVAL LOW COMPLEX 20 MIN: CPT

## 2022-07-18 PROCEDURE — 97110 THERAPEUTIC EXERCISES: CPT

## 2022-07-20 ENCOUNTER — OFFICE VISIT (OUTPATIENT)
Dept: PHYSICAL THERAPY | Facility: HOSPITAL | Age: 77
End: 2022-07-20
Attending: FAMILY MEDICINE
Payer: MEDICAID

## 2022-07-20 PROCEDURE — 97140 MANUAL THERAPY 1/> REGIONS: CPT

## 2022-07-20 PROCEDURE — 97110 THERAPEUTIC EXERCISES: CPT

## 2022-07-25 ENCOUNTER — OFFICE VISIT (OUTPATIENT)
Dept: PHYSICAL THERAPY | Facility: HOSPITAL | Age: 77
End: 2022-07-25
Attending: FAMILY MEDICINE
Payer: MEDICAID

## 2022-07-25 PROCEDURE — 97110 THERAPEUTIC EXERCISES: CPT

## 2022-07-25 PROCEDURE — 97140 MANUAL THERAPY 1/> REGIONS: CPT

## 2022-07-27 ENCOUNTER — OFFICE VISIT (OUTPATIENT)
Dept: PHYSICAL THERAPY | Facility: HOSPITAL | Age: 77
End: 2022-07-27
Attending: FAMILY MEDICINE
Payer: MEDICAID

## 2022-07-27 PROCEDURE — 97110 THERAPEUTIC EXERCISES: CPT

## 2022-08-01 ENCOUNTER — OFFICE VISIT (OUTPATIENT)
Dept: PHYSICAL THERAPY | Facility: HOSPITAL | Age: 77
End: 2022-08-01
Attending: FAMILY MEDICINE
Payer: MEDICAID

## 2022-08-01 PROCEDURE — 97110 THERAPEUTIC EXERCISES: CPT

## 2022-08-03 ENCOUNTER — OFFICE VISIT (OUTPATIENT)
Dept: PHYSICAL THERAPY | Facility: HOSPITAL | Age: 77
End: 2022-08-03
Attending: FAMILY MEDICINE
Payer: MEDICAID

## 2022-08-03 PROCEDURE — 97110 THERAPEUTIC EXERCISES: CPT

## 2022-08-08 ENCOUNTER — OFFICE VISIT (OUTPATIENT)
Dept: PHYSICAL THERAPY | Facility: HOSPITAL | Age: 77
End: 2022-08-08
Attending: FAMILY MEDICINE
Payer: MEDICAID

## 2022-08-08 PROCEDURE — 97110 THERAPEUTIC EXERCISES: CPT

## 2022-08-10 ENCOUNTER — TELEPHONE (OUTPATIENT)
Dept: PHYSICAL THERAPY | Facility: HOSPITAL | Age: 77
End: 2022-08-10

## 2022-08-10 ENCOUNTER — OFFICE VISIT (OUTPATIENT)
Dept: PHYSICAL THERAPY | Facility: HOSPITAL | Age: 77
End: 2022-08-10
Attending: FAMILY MEDICINE
Payer: MEDICAID

## 2022-08-10 PROCEDURE — 97110 THERAPEUTIC EXERCISES: CPT

## 2022-10-27 ENCOUNTER — OFFICE VISIT (OUTPATIENT)
Dept: INTERNAL MEDICINE CLINIC | Facility: CLINIC | Age: 77
End: 2022-10-27
Payer: MEDICAID

## 2022-10-27 VITALS
DIASTOLIC BLOOD PRESSURE: 60 MMHG | WEIGHT: 152 LBS | BODY MASS INDEX: 25.95 KG/M2 | HEART RATE: 68 BPM | OXYGEN SATURATION: 98 % | HEIGHT: 64 IN | SYSTOLIC BLOOD PRESSURE: 110 MMHG

## 2022-10-27 DIAGNOSIS — E78.2 MIXED HYPERLIPIDEMIA: ICD-10-CM

## 2022-10-27 DIAGNOSIS — E55.9 VITAMIN D DEFICIENCY: ICD-10-CM

## 2022-10-27 DIAGNOSIS — Z00.00 WELLNESS EXAMINATION: Primary | ICD-10-CM

## 2022-10-27 DIAGNOSIS — K58.1 IRRITABLE BOWEL SYNDROME WITH CONSTIPATION: ICD-10-CM

## 2022-10-27 DIAGNOSIS — F01.50 VASCULAR DEMENTIA WITHOUT BEHAVIORAL DISTURBANCE (HCC): ICD-10-CM

## 2022-10-27 PROCEDURE — 99397 PER PM REEVAL EST PAT 65+ YR: CPT | Performed by: INTERNAL MEDICINE

## 2022-10-27 PROCEDURE — 3008F BODY MASS INDEX DOCD: CPT | Performed by: INTERNAL MEDICINE

## 2022-10-27 PROCEDURE — 3074F SYST BP LT 130 MM HG: CPT | Performed by: INTERNAL MEDICINE

## 2022-10-27 PROCEDURE — 3078F DIAST BP <80 MM HG: CPT | Performed by: INTERNAL MEDICINE

## 2022-10-27 RX ORDER — SIMVASTATIN 20 MG
20 TABLET ORAL NIGHTLY
Qty: 90 TABLET | Refills: 3 | Status: SHIPPED | OUTPATIENT
Start: 2022-10-27

## 2022-10-27 RX ORDER — ERGOCALCIFEROL 1.25 MG/1
50000 CAPSULE ORAL WEEKLY
Qty: 12 CAPSULE | Refills: 3 | Status: SHIPPED | OUTPATIENT
Start: 2022-10-27

## 2022-10-27 RX ORDER — DICYCLOMINE HYDROCHLORIDE 10 MG/1
10 CAPSULE ORAL 3 TIMES DAILY PRN
Qty: 20 CAPSULE | Refills: 3 | Status: SHIPPED | OUTPATIENT
Start: 2022-10-27 | End: 2022-11-06

## 2022-10-27 RX ORDER — CLOTRIMAZOLE 1 %
CREAM (GRAM) TOPICAL
Qty: 40 G | Refills: 3 | Status: SHIPPED | OUTPATIENT
Start: 2022-10-27

## 2022-10-27 RX ORDER — DONEPEZIL HYDROCHLORIDE 10 MG/1
10 TABLET, FILM COATED ORAL NIGHTLY
Qty: 90 TABLET | Refills: 0 | Status: SHIPPED | OUTPATIENT
Start: 2022-10-27 | End: 2022-10-31

## 2022-10-29 ENCOUNTER — LAB ENCOUNTER (OUTPATIENT)
Dept: LAB | Age: 77
End: 2022-10-29
Attending: INTERNAL MEDICINE
Payer: MEDICAID

## 2022-10-29 DIAGNOSIS — Z00.00 WELLNESS EXAMINATION: ICD-10-CM

## 2022-10-29 DIAGNOSIS — E55.9 VITAMIN D DEFICIENCY: ICD-10-CM

## 2022-10-29 DIAGNOSIS — E78.2 MIXED HYPERLIPIDEMIA: ICD-10-CM

## 2022-10-29 LAB
ALBUMIN SERPL-MCNC: 3.7 G/DL (ref 3.4–5)
ALBUMIN/GLOB SERPL: 1.1 {RATIO} (ref 1–2)
ALP LIVER SERPL-CCNC: 80 U/L
ALT SERPL-CCNC: 16 U/L
ANION GAP SERPL CALC-SCNC: 8 MMOL/L (ref 0–18)
AST SERPL-CCNC: 13 U/L (ref 15–37)
BASOPHILS # BLD AUTO: 0.03 X10(3) UL (ref 0–0.2)
BASOPHILS NFR BLD AUTO: 1.1 %
BILIRUB SERPL-MCNC: 0.7 MG/DL (ref 0.1–2)
BUN BLD-MCNC: 17 MG/DL (ref 7–18)
BUN/CREAT SERPL: 22.1 (ref 10–20)
CALCIUM BLD-MCNC: 9.2 MG/DL (ref 8.5–10.1)
CHLORIDE SERPL-SCNC: 109 MMOL/L (ref 98–112)
CHOLEST SERPL-MCNC: 234 MG/DL (ref ?–200)
CO2 SERPL-SCNC: 26 MMOL/L (ref 21–32)
CREAT BLD-MCNC: 0.77 MG/DL
DEPRECATED RDW RBC AUTO: 49.4 FL (ref 35.1–46.3)
EOSINOPHIL # BLD AUTO: 0.06 X10(3) UL (ref 0–0.7)
EOSINOPHIL NFR BLD AUTO: 2.2 %
ERYTHROCYTE [DISTWIDTH] IN BLOOD BY AUTOMATED COUNT: 13.4 % (ref 11–15)
FASTING PATIENT LIPID ANSWER: YES
FASTING STATUS PATIENT QL REPORTED: YES
GFR SERPLBLD BASED ON 1.73 SQ M-ARVRAT: 79 ML/MIN/1.73M2 (ref 60–?)
GLOBULIN PLAS-MCNC: 3.5 G/DL (ref 2.8–4.4)
GLUCOSE BLD-MCNC: 98 MG/DL (ref 70–99)
HCT VFR BLD AUTO: 40.3 %
HDLC SERPL-MCNC: 59 MG/DL (ref 40–59)
HGB BLD-MCNC: 13.1 G/DL
IMM GRANULOCYTES # BLD AUTO: 0.02 X10(3) UL (ref 0–1)
IMM GRANULOCYTES NFR BLD: 0.7 %
LDLC SERPL CALC-MCNC: 154 MG/DL (ref ?–100)
LYMPHOCYTES # BLD AUTO: 0.92 X10(3) UL (ref 1–4)
LYMPHOCYTES NFR BLD AUTO: 33.8 %
MCH RBC QN AUTO: 32.1 PG (ref 26–34)
MCHC RBC AUTO-ENTMCNC: 32.5 G/DL (ref 31–37)
MCV RBC AUTO: 98.8 FL
MONOCYTES # BLD AUTO: 0.25 X10(3) UL (ref 0.1–1)
MONOCYTES NFR BLD AUTO: 9.2 %
NEUTROPHILS # BLD AUTO: 1.44 X10 (3) UL (ref 1.5–7.7)
NEUTROPHILS # BLD AUTO: 1.44 X10(3) UL (ref 1.5–7.7)
NEUTROPHILS NFR BLD AUTO: 53 %
NONHDLC SERPL-MCNC: 175 MG/DL (ref ?–130)
OSMOLALITY SERPL CALC.SUM OF ELEC: 298 MOSM/KG (ref 275–295)
POTASSIUM SERPL-SCNC: 4.3 MMOL/L (ref 3.5–5.1)
PROT SERPL-MCNC: 7.2 G/DL (ref 6.4–8.2)
RBC # BLD AUTO: 4.08 X10(6)UL
SODIUM SERPL-SCNC: 143 MMOL/L (ref 136–145)
TRIGL SERPL-MCNC: 118 MG/DL (ref 30–149)
VIT D+METAB SERPL-MCNC: 47.7 NG/ML (ref 30–100)
VLDLC SERPL CALC-MCNC: 23 MG/DL (ref 0–30)
WBC # BLD AUTO: 2.7 X10(3) UL (ref 4–11)

## 2022-10-29 PROCEDURE — 80061 LIPID PANEL: CPT

## 2022-10-29 PROCEDURE — 80053 COMPREHEN METABOLIC PANEL: CPT

## 2022-10-29 PROCEDURE — 82306 VITAMIN D 25 HYDROXY: CPT

## 2022-10-29 PROCEDURE — 36415 COLL VENOUS BLD VENIPUNCTURE: CPT

## 2022-10-29 PROCEDURE — 85025 COMPLETE CBC W/AUTO DIFF WBC: CPT

## 2022-10-31 DIAGNOSIS — D70.9 NEUTROPENIA, UNSPECIFIED TYPE (HCC): Primary | ICD-10-CM

## 2022-10-31 RX ORDER — DONEPEZIL HYDROCHLORIDE 10 MG/1
10 TABLET, FILM COATED ORAL NIGHTLY
Qty: 90 TABLET | Refills: 3 | Status: SHIPPED | OUTPATIENT
Start: 2022-10-31

## 2022-12-16 ENCOUNTER — HOSPITAL ENCOUNTER (OUTPATIENT)
Age: 77
Discharge: ANOTHER HOSPITAL | End: 2022-12-16
Payer: MEDICAID

## 2022-12-16 ENCOUNTER — APPOINTMENT (OUTPATIENT)
Dept: GENERAL RADIOLOGY | Facility: HOSPITAL | Age: 77
End: 2022-12-16
Attending: EMERGENCY MEDICINE
Payer: MEDICAID

## 2022-12-16 ENCOUNTER — APPOINTMENT (OUTPATIENT)
Dept: MRI IMAGING | Facility: HOSPITAL | Age: 77
End: 2022-12-16
Attending: EMERGENCY MEDICINE
Payer: MEDICAID

## 2022-12-16 ENCOUNTER — HOSPITAL ENCOUNTER (EMERGENCY)
Facility: HOSPITAL | Age: 77
Discharge: HOME | End: 2022-12-17
Attending: EMERGENCY MEDICINE
Payer: MEDICAID

## 2022-12-16 VITALS
HEART RATE: 82 BPM | TEMPERATURE: 98 F | OXYGEN SATURATION: 98 % | RESPIRATION RATE: 20 BRPM | SYSTOLIC BLOOD PRESSURE: 139 MMHG | DIASTOLIC BLOOD PRESSURE: 70 MMHG

## 2022-12-16 DIAGNOSIS — R14.0 ABDOMINAL BLOATING: ICD-10-CM

## 2022-12-16 DIAGNOSIS — R53.1 WEAKNESS GENERALIZED: Primary | ICD-10-CM

## 2022-12-16 DIAGNOSIS — R11.0 NAUSEA: ICD-10-CM

## 2022-12-16 DIAGNOSIS — R53.1 WEAKNESS GENERALIZED: ICD-10-CM

## 2022-12-16 DIAGNOSIS — M79.10 MYALGIA: Primary | ICD-10-CM

## 2022-12-16 DIAGNOSIS — R55 SYNCOPE, NEAR: ICD-10-CM

## 2022-12-16 LAB
ANION GAP SERPL CALC-SCNC: 5 MMOL/L (ref 0–18)
BASOPHILS # BLD AUTO: 0.03 X10(3) UL (ref 0–0.2)
BASOPHILS NFR BLD AUTO: 0.6 %
BILIRUB UR QL: NEGATIVE
BUN BLD-MCNC: 16 MG/DL (ref 7–18)
BUN/CREAT SERPL: 19.5 (ref 10–20)
CALCIUM BLD-MCNC: 9.2 MG/DL (ref 8.5–10.1)
CHLORIDE SERPL-SCNC: 110 MMOL/L (ref 98–112)
CK SERPL-CCNC: 38 U/L
CLARITY UR: CLEAR
CO2 SERPL-SCNC: 25 MMOL/L (ref 21–32)
COLOR UR: YELLOW
CREAT BLD-MCNC: 0.82 MG/DL
CRP SERPL-MCNC: <0.29 MG/DL (ref ?–0.3)
DEPRECATED RDW RBC AUTO: 44.7 FL (ref 35.1–46.3)
EOSINOPHIL # BLD AUTO: 0.1 X10(3) UL (ref 0–0.7)
EOSINOPHIL NFR BLD AUTO: 1.9 %
ERYTHROCYTE [DISTWIDTH] IN BLOOD BY AUTOMATED COUNT: 12.8 % (ref 11–15)
ERYTHROCYTE [SEDIMENTATION RATE] IN BLOOD: 29 MM/HR
FLUAV + FLUBV RNA SPEC NAA+PROBE: NEGATIVE
FLUAV + FLUBV RNA SPEC NAA+PROBE: NEGATIVE
GFR SERPLBLD BASED ON 1.73 SQ M-ARVRAT: 74 ML/MIN/1.73M2 (ref 60–?)
GLUCOSE BLD-MCNC: 97 MG/DL (ref 70–99)
GLUCOSE UR-MCNC: NEGATIVE MG/DL
HCT VFR BLD AUTO: 41.1 %
HGB BLD-MCNC: 13.7 G/DL
HGB UR QL STRIP.AUTO: NEGATIVE
IMM GRANULOCYTES # BLD AUTO: 0.02 X10(3) UL (ref 0–1)
IMM GRANULOCYTES NFR BLD: 0.4 %
KETONES UR-MCNC: NEGATIVE MG/DL
LEUKOCYTE ESTERASE UR QL STRIP.AUTO: NEGATIVE
LYMPHOCYTES # BLD AUTO: 1.84 X10(3) UL (ref 1–4)
LYMPHOCYTES NFR BLD AUTO: 35.5 %
MCH RBC QN AUTO: 31.5 PG (ref 26–34)
MCHC RBC AUTO-ENTMCNC: 33.3 G/DL (ref 31–37)
MCV RBC AUTO: 94.5 FL
MONOCYTES # BLD AUTO: 0.4 X10(3) UL (ref 0.1–1)
MONOCYTES NFR BLD AUTO: 7.7 %
NEUTROPHILS # BLD AUTO: 2.79 X10 (3) UL (ref 1.5–7.7)
NEUTROPHILS # BLD AUTO: 2.79 X10(3) UL (ref 1.5–7.7)
NEUTROPHILS NFR BLD AUTO: 53.9 %
NITRITE UR QL STRIP.AUTO: NEGATIVE
OSMOLALITY SERPL CALC.SUM OF ELEC: 291 MOSM/KG (ref 275–295)
PH UR: 5 [PH] (ref 5–8)
PLATELET # BLD AUTO: 224 10(3)UL (ref 150–450)
POTASSIUM SERPL-SCNC: 3.8 MMOL/L (ref 3.5–5.1)
PROT UR-MCNC: NEGATIVE MG/DL
RBC # BLD AUTO: 4.35 X10(6)UL
RSV RNA SPEC NAA+PROBE: NEGATIVE
SARS-COV-2 RNA RESP QL NAA+PROBE: NOT DETECTED
SODIUM SERPL-SCNC: 140 MMOL/L (ref 136–145)
SP GR UR STRIP: 1.01 (ref 1–1.03)
TROPONIN I HIGH SENSITIVITY: 4 NG/L
UROBILINOGEN UR STRIP-ACNC: <2
VIT C UR-MCNC: NEGATIVE MG/DL
WBC # BLD AUTO: 5.2 X10(3) UL (ref 4–11)

## 2022-12-16 PROCEDURE — 70551 MRI BRAIN STEM W/O DYE: CPT | Performed by: EMERGENCY MEDICINE

## 2022-12-16 PROCEDURE — 99284 EMERGENCY DEPT VISIT MOD MDM: CPT

## 2022-12-16 PROCEDURE — 85025 COMPLETE CBC W/AUTO DIFF WBC: CPT | Performed by: EMERGENCY MEDICINE

## 2022-12-16 PROCEDURE — 71045 X-RAY EXAM CHEST 1 VIEW: CPT | Performed by: EMERGENCY MEDICINE

## 2022-12-16 PROCEDURE — 0241U SARS-COV-2/FLU A AND B/RSV BY PCR (GENEXPERT): CPT | Performed by: EMERGENCY MEDICINE

## 2022-12-16 PROCEDURE — 99214 OFFICE O/P EST MOD 30 MIN: CPT | Performed by: PHYSICIAN ASSISTANT

## 2022-12-16 PROCEDURE — 86140 C-REACTIVE PROTEIN: CPT | Performed by: EMERGENCY MEDICINE

## 2022-12-16 PROCEDURE — 82550 ASSAY OF CK (CPK): CPT | Performed by: EMERGENCY MEDICINE

## 2022-12-16 PROCEDURE — 80048 BASIC METABOLIC PNL TOTAL CA: CPT | Performed by: EMERGENCY MEDICINE

## 2022-12-16 PROCEDURE — 84484 ASSAY OF TROPONIN QUANT: CPT | Performed by: EMERGENCY MEDICINE

## 2022-12-16 PROCEDURE — 81003 URINALYSIS AUTO W/O SCOPE: CPT | Performed by: EMERGENCY MEDICINE

## 2022-12-16 PROCEDURE — 85652 RBC SED RATE AUTOMATED: CPT | Performed by: EMERGENCY MEDICINE

## 2022-12-16 PROCEDURE — 96360 HYDRATION IV INFUSION INIT: CPT

## 2022-12-16 NOTE — ED INITIAL ASSESSMENT (HPI)
Daughter reports that pt has been c/o weakness, lack of energy, poor appetite for approximately 3 weeks. Poor PO intake. Concerned for dehydration. Hx of gastritis, feels bloated when she drinks fluids. Denies fevers, cough, vomiting or diarrhea but +nausea. Denies any urinary concerns.

## 2022-12-17 VITALS
SYSTOLIC BLOOD PRESSURE: 140 MMHG | HEART RATE: 67 BPM | DIASTOLIC BLOOD PRESSURE: 69 MMHG | RESPIRATION RATE: 18 BRPM | WEIGHT: 155 LBS | BODY MASS INDEX: 25.83 KG/M2 | HEIGHT: 65 IN | TEMPERATURE: 97 F | OXYGEN SATURATION: 98 %

## 2023-03-23 NOTE — PROGRESS NOTES
HPI:    Patient ID: Janeth Messer is a 68year old female. Pt here for a 6 month check up. Has been about the same in terms of memory. Had a complete Gi workup recently. Has no active new complaints.     Review of Systems   Constitutional: Negative Pt to be transferred to [facility] for a NM stress test in AM as per cardiology recommendation with plans to transfer back to [facility] after the stress test tomorrow. He should be kept NPO for this tonight    Pt and Daughter, Citlali Frye, notified and verbalize understanding.      Neck: Neck supple. No JVD present. Cardiovascular: Normal rate, regular rhythm and normal heart sounds. Pulmonary/Chest: Effort normal. She has no wheezes. She has no rales. Abdominal: She exhibits no distension. There is no tenderness.    Musculos

## 2023-06-24 ENCOUNTER — HOSPITAL ENCOUNTER (OUTPATIENT)
Age: 78
Discharge: HOME | End: 2023-06-24
Payer: MEDICAID

## 2023-06-24 ENCOUNTER — APPOINTMENT (OUTPATIENT)
Dept: GENERAL RADIOLOGY | Age: 78
End: 2023-06-24
Attending: NURSE PRACTITIONER
Payer: MEDICAID

## 2023-06-24 VITALS
DIASTOLIC BLOOD PRESSURE: 59 MMHG | OXYGEN SATURATION: 98 % | RESPIRATION RATE: 16 BRPM | TEMPERATURE: 98 F | SYSTOLIC BLOOD PRESSURE: 123 MMHG | HEART RATE: 71 BPM

## 2023-06-24 DIAGNOSIS — M54.42 ACUTE LOW BACK PAIN WITH BILATERAL SCIATICA, UNSPECIFIED BACK PAIN LATERALITY: Primary | ICD-10-CM

## 2023-06-24 DIAGNOSIS — M54.41 ACUTE LOW BACK PAIN WITH BILATERAL SCIATICA, UNSPECIFIED BACK PAIN LATERALITY: Primary | ICD-10-CM

## 2023-06-24 LAB
BILIRUB UR QL STRIP: NEGATIVE
CLARITY UR: CLEAR
COLOR UR: YELLOW
GLUCOSE UR STRIP-MCNC: NEGATIVE MG/DL
KETONES UR STRIP-MCNC: NEGATIVE MG/DL
LEUKOCYTE ESTERASE UR QL STRIP: NEGATIVE
NITRITE UR QL STRIP: NEGATIVE
PH UR STRIP: 6 [PH]
PROT UR STRIP-MCNC: NEGATIVE MG/DL
SP GR UR STRIP: 1.02
UROBILINOGEN UR STRIP-ACNC: <2 MG/DL

## 2023-06-24 PROCEDURE — 96372 THER/PROPH/DIAG INJ SC/IM: CPT | Performed by: NURSE PRACTITIONER

## 2023-06-24 PROCEDURE — 81002 URINALYSIS NONAUTO W/O SCOPE: CPT | Performed by: NURSE PRACTITIONER

## 2023-06-24 PROCEDURE — 72100 X-RAY EXAM L-S SPINE 2/3 VWS: CPT | Performed by: NURSE PRACTITIONER

## 2023-06-24 PROCEDURE — 99213 OFFICE O/P EST LOW 20 MIN: CPT | Performed by: NURSE PRACTITIONER

## 2023-06-24 RX ORDER — KETOROLAC TROMETHAMINE 30 MG/ML
30 INJECTION, SOLUTION INTRAMUSCULAR; INTRAVENOUS ONCE
Status: COMPLETED | OUTPATIENT
Start: 2023-06-24 | End: 2023-06-24

## 2023-06-24 NOTE — ED INITIAL ASSESSMENT (HPI)
Pt with c/o low back pain since Wednesday. Pt accompanied by daughter who in interpreting with pt's consent. Pt stated pain radiates down the back of her legs. Pt rated pain 10/10. Pt denied pain.

## 2023-06-24 NOTE — DISCHARGE INSTRUCTIONS
Please take Tylenol or ibuprofen as directed. You may not take ibuprofen until 9:30 PM tonight as you were given Toradol here. Close follow-up with your primary care provider or Dr. Denzel Mendenhall is recommended. Contact information has been provided in your discharge paperwork.   Any loss of bowel or bladder control, numbness or tingling to lower extremities please go to the emergency department

## 2023-10-05 RX ORDER — SIMVASTATIN 20 MG
20 TABLET ORAL NIGHTLY
Qty: 90 TABLET | Refills: 0 | Status: SHIPPED | OUTPATIENT
Start: 2023-10-05

## 2023-10-09 ENCOUNTER — APPOINTMENT (OUTPATIENT)
Dept: GENERAL RADIOLOGY | Age: 78
End: 2023-10-09
Attending: PHYSICIAN ASSISTANT
Payer: MEDICAID

## 2023-10-09 ENCOUNTER — HOSPITAL ENCOUNTER (OUTPATIENT)
Age: 78
Discharge: HOME | End: 2023-10-09
Payer: MEDICAID

## 2023-10-09 VITALS
TEMPERATURE: 97 F | OXYGEN SATURATION: 100 % | DIASTOLIC BLOOD PRESSURE: 73 MMHG | HEART RATE: 84 BPM | RESPIRATION RATE: 15 BRPM | SYSTOLIC BLOOD PRESSURE: 142 MMHG

## 2023-10-09 DIAGNOSIS — S63.501A SPRAIN OF RIGHT WRIST, INITIAL ENCOUNTER: Primary | ICD-10-CM
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
## 2023-10-09 PROCEDURE — 99213 OFFICE O/P EST LOW 20 MIN: CPT | Performed by: PHYSICIAN ASSISTANT

## 2023-10-09 PROCEDURE — 73130 X-RAY EXAM OF HAND: CPT | Performed by: PHYSICIAN ASSISTANT

## 2023-10-09 PROCEDURE — L3924 HFO WITHOUT JOINTS PRE OTS: HCPCS | Performed by: PHYSICIAN ASSISTANT

## 2023-10-09 PROCEDURE — 73110 X-RAY EXAM OF WRIST: CPT | Performed by: PHYSICIAN ASSISTANT

## 2023-11-24 ENCOUNTER — OFFICE VISIT (OUTPATIENT)
Dept: INTERNAL MEDICINE CLINIC | Facility: CLINIC | Age: 78
End: 2023-11-24
Payer: MEDICAID

## 2023-11-24 VITALS
OXYGEN SATURATION: 99 % | WEIGHT: 154 LBS | SYSTOLIC BLOOD PRESSURE: 120 MMHG | HEIGHT: 65 IN | BODY MASS INDEX: 25.66 KG/M2 | HEART RATE: 63 BPM | DIASTOLIC BLOOD PRESSURE: 60 MMHG

## 2023-11-24 DIAGNOSIS — F01.50 VASCULAR DEMENTIA WITHOUT BEHAVIORAL DISTURBANCE (HCC): ICD-10-CM

## 2023-11-24 DIAGNOSIS — Z00.00 WELLNESS EXAMINATION: Primary | ICD-10-CM

## 2023-11-24 DIAGNOSIS — E78.2 MIXED HYPERLIPIDEMIA: ICD-10-CM

## 2023-11-24 DIAGNOSIS — Z90.49 STATUS POST LAPAROSCOPIC CHOLECYSTECTOMY: ICD-10-CM

## 2023-11-24 PROCEDURE — 3078F DIAST BP <80 MM HG: CPT | Performed by: INTERNAL MEDICINE

## 2023-11-24 PROCEDURE — 99397 PER PM REEVAL EST PAT 65+ YR: CPT | Performed by: INTERNAL MEDICINE

## 2023-11-24 PROCEDURE — 3074F SYST BP LT 130 MM HG: CPT | Performed by: INTERNAL MEDICINE

## 2023-11-24 PROCEDURE — 3008F BODY MASS INDEX DOCD: CPT | Performed by: INTERNAL MEDICINE

## 2023-11-24 RX ORDER — SIMVASTATIN 20 MG
20 TABLET ORAL NIGHTLY
Qty: 90 TABLET | Refills: 3 | Status: SHIPPED | OUTPATIENT
Start: 2023-11-24

## 2023-11-24 RX ORDER — DONEPEZIL HYDROCHLORIDE 10 MG/1
10 TABLET, FILM COATED ORAL NIGHTLY
Qty: 90 TABLET | Refills: 3 | Status: SHIPPED | OUTPATIENT
Start: 2023-11-24

## 2023-11-24 RX ORDER — CLOTRIMAZOLE 1 %
CREAM (GRAM) TOPICAL
Qty: 40 G | Refills: 3 | Status: SHIPPED | OUTPATIENT
Start: 2023-11-24

## 2023-11-24 RX ORDER — ERGOCALCIFEROL 1.25 MG/1
50000 CAPSULE ORAL WEEKLY
Qty: 12 CAPSULE | Refills: 3 | Status: SHIPPED | OUTPATIENT
Start: 2023-11-24

## 2023-12-23 ENCOUNTER — LAB ENCOUNTER (OUTPATIENT)
Dept: LAB | Age: 78
End: 2023-12-23
Attending: INTERNAL MEDICINE
Payer: MEDICAID

## 2023-12-23 DIAGNOSIS — E78.2 MIXED HYPERLIPIDEMIA: ICD-10-CM

## 2023-12-23 DIAGNOSIS — Z00.00 WELLNESS EXAMINATION: ICD-10-CM

## 2023-12-23 LAB
ALBUMIN SERPL-MCNC: 4.4 G/DL (ref 3.2–4.8)
ALBUMIN/GLOB SERPL: 1.7 {RATIO} (ref 1–2)
ALP LIVER SERPL-CCNC: 78 U/L
ALT SERPL-CCNC: 7 U/L
ANION GAP SERPL CALC-SCNC: 5 MMOL/L (ref 0–18)
AST SERPL-CCNC: 15 U/L (ref ?–34)
BASOPHILS # BLD AUTO: 0.06 X10(3) UL (ref 0–0.2)
BASOPHILS NFR BLD AUTO: 1.4 %
BILIRUB SERPL-MCNC: 0.6 MG/DL (ref 0.2–1.1)
BUN BLD-MCNC: 14 MG/DL (ref 9–23)
BUN/CREAT SERPL: 16.9 (ref 10–20)
CALCIUM BLD-MCNC: 9.6 MG/DL (ref 8.7–10.4)
CHLORIDE SERPL-SCNC: 108 MMOL/L (ref 98–112)
CHOLEST SERPL-MCNC: 220 MG/DL (ref ?–200)
CO2 SERPL-SCNC: 28 MMOL/L (ref 21–32)
CREAT BLD-MCNC: 0.83 MG/DL
DEPRECATED RDW RBC AUTO: 47.3 FL (ref 35.1–46.3)
EGFRCR SERPLBLD CKD-EPI 2021: 72 ML/MIN/1.73M2 (ref 60–?)
EOSINOPHIL # BLD AUTO: 0.1 X10(3) UL (ref 0–0.7)
EOSINOPHIL NFR BLD AUTO: 2.3 %
ERYTHROCYTE [DISTWIDTH] IN BLOOD BY AUTOMATED COUNT: 13.7 % (ref 11–15)
FASTING PATIENT LIPID ANSWER: YES
FASTING STATUS PATIENT QL REPORTED: YES
GLOBULIN PLAS-MCNC: 2.6 G/DL (ref 2.8–4.4)
GLUCOSE BLD-MCNC: 89 MG/DL (ref 70–99)
HCT VFR BLD AUTO: 37.7 %
HDLC SERPL-MCNC: 61 MG/DL (ref 40–59)
HGB BLD-MCNC: 12.6 G/DL
IMM GRANULOCYTES # BLD AUTO: 0.01 X10(3) UL (ref 0–1)
IMM GRANULOCYTES NFR BLD: 0.2 %
LDLC SERPL CALC-MCNC: 144 MG/DL (ref ?–100)
LYMPHOCYTES # BLD AUTO: 2 X10(3) UL (ref 1–4)
LYMPHOCYTES NFR BLD AUTO: 45.8 %
MCH RBC QN AUTO: 31.5 PG (ref 26–34)
MCHC RBC AUTO-ENTMCNC: 33.4 G/DL (ref 31–37)
MCV RBC AUTO: 94.3 FL
MONOCYTES # BLD AUTO: 0.38 X10(3) UL (ref 0.1–1)
MONOCYTES NFR BLD AUTO: 8.7 %
NEUTROPHILS # BLD AUTO: 1.82 X10 (3) UL (ref 1.5–7.7)
NEUTROPHILS # BLD AUTO: 1.82 X10(3) UL (ref 1.5–7.7)
NEUTROPHILS NFR BLD AUTO: 41.6 %
NONHDLC SERPL-MCNC: 159 MG/DL (ref ?–130)
OSMOLALITY SERPL CALC.SUM OF ELEC: 292 MOSM/KG (ref 275–295)
PLATELET # BLD AUTO: 242 10(3)UL (ref 150–450)
POTASSIUM SERPL-SCNC: 4.2 MMOL/L (ref 3.5–5.1)
PROT SERPL-MCNC: 7 G/DL (ref 5.7–8.2)
RBC # BLD AUTO: 4 X10(6)UL
SODIUM SERPL-SCNC: 141 MMOL/L (ref 136–145)
T4 FREE SERPL-MCNC: 1.1 NG/DL (ref 0.8–1.7)
TRIGL SERPL-MCNC: 83 MG/DL (ref 30–149)
TSI SER-ACNC: 3.05 MIU/ML (ref 0.55–4.78)
VLDLC SERPL CALC-MCNC: 15 MG/DL (ref 0–30)
WBC # BLD AUTO: 4.4 X10(3) UL (ref 4–11)

## 2023-12-23 PROCEDURE — 36415 COLL VENOUS BLD VENIPUNCTURE: CPT

## 2023-12-23 PROCEDURE — 84439 ASSAY OF FREE THYROXINE: CPT

## 2023-12-23 PROCEDURE — 80061 LIPID PANEL: CPT

## 2023-12-23 PROCEDURE — 84443 ASSAY THYROID STIM HORMONE: CPT

## 2023-12-23 PROCEDURE — 80053 COMPREHEN METABOLIC PANEL: CPT

## 2023-12-23 PROCEDURE — 85025 COMPLETE CBC W/AUTO DIFF WBC: CPT

## 2024-05-03 ENCOUNTER — HOSPITAL ENCOUNTER (EMERGENCY)
Facility: HOSPITAL | Age: 79
Discharge: HOME | End: 2024-05-03
Attending: EMERGENCY MEDICINE
Payer: MEDICAID

## 2024-05-03 ENCOUNTER — APPOINTMENT (OUTPATIENT)
Dept: GENERAL RADIOLOGY | Facility: HOSPITAL | Age: 79
End: 2024-05-03
Attending: EMERGENCY MEDICINE
Payer: MEDICAID

## 2024-05-03 VITALS
DIASTOLIC BLOOD PRESSURE: 65 MMHG | OXYGEN SATURATION: 99 % | RESPIRATION RATE: 22 BRPM | HEART RATE: 68 BPM | HEIGHT: 65 IN | BODY MASS INDEX: 26.66 KG/M2 | SYSTOLIC BLOOD PRESSURE: 134 MMHG | WEIGHT: 160 LBS

## 2024-05-03 DIAGNOSIS — S42.201A CLOSED FRACTURE OF PROXIMAL END OF RIGHT HUMERUS, UNSPECIFIED FRACTURE MORPHOLOGY, INITIAL ENCOUNTER: Primary | ICD-10-CM

## 2024-05-03 PROCEDURE — 73030 X-RAY EXAM OF SHOULDER: CPT | Performed by: EMERGENCY MEDICINE

## 2024-05-03 PROCEDURE — 99284 EMERGENCY DEPT VISIT MOD MDM: CPT

## 2024-05-03 PROCEDURE — 96374 THER/PROPH/DIAG INJ IV PUSH: CPT

## 2024-05-03 RX ORDER — HYDROCODONE BITARTRATE AND ACETAMINOPHEN 5; 325 MG/1; MG/1
1 TABLET ORAL EVERY 4 HOURS PRN
Qty: 12 TABLET | Refills: 0 | Status: SHIPPED | OUTPATIENT
Start: 2024-05-03 | End: 2024-05-06

## 2024-05-03 RX ORDER — MORPHINE SULFATE 4 MG/ML
4 INJECTION, SOLUTION INTRAMUSCULAR; INTRAVENOUS ONCE
Status: COMPLETED | OUTPATIENT
Start: 2024-05-03 | End: 2024-05-03

## 2024-05-03 NOTE — ED PROVIDER NOTES
Patient Seen in: [facility] Emergency Department      History     Chief Complaint   Patient presents with    Fall     Stated Complaint: Fall    Subjective:   HPI    Patient is a 79-year-old female that speaks Telugu she is brought into the ER by EMS.  She was found outside where neighbor saw her fall.  She complains of pain to her right shoulder.  She denies head injury denies neck pain    Objective:   Past Medical History:    Arthritis    Atrophic gastritis    Diverticulosis large intestine w/o perforation or abscess w/o bleeding    Diverticulosis of large intestine    Esophageal reflux    Gastropathy    High cholesterol    Internal hemorrhoids without complication    Visual impairment              Past Surgical History:   Procedure Laterality Date    Cholecystectomy      Colonoscopy N/A 6/20/2018    Procedure: COLONOSCOPY;  Surgeon: Linda Espinal MD;  Location: [facility] ENDO    Colonoscopy      Upper gi endoscopy,exam                  Social History     Socioeconomic History    Marital status:    Tobacco Use    Smoking status: Never    Smokeless tobacco: Never   Substance and Sexual Activity    Alcohol use: No    Drug use: No              Review of Systems    Positive for stated complaint: Fall  Other systems are as noted in HPI.  Constitutional and vital signs reviewed.      All other systems reviewed and negative except as noted above.    Physical Exam     ED Triage Vitals   BP 05/03/24 1330 134/65   Pulse 05/03/24 1229 71   Resp 05/03/24 1229 24   Temp --    Temp src --    SpO2 05/03/24 1229 96 %   O2 Device 05/03/24 1229 None (Room air)       Current:/65   Pulse 67   Resp 22   Ht 165.1 cm (5' 5\")   Wt 72.6 kg   SpO2 99%   BMI 26.63 kg/m²         Physical Exam    Constitutional: Oriented to person, place, and time. Appears well-developed and well-nourished.   HEENT:   Head: Normocephalic and atraumatic.   Right Ear: External ear normal.   Left Ear: External ear normal.    Nose: Nose normal.   Mouth/Throat: Oropharynx is clear and moist.   Eyes: Conjunctivae and EOM are normal. Pupils are equal, round, and reactive to light.   Neck: Neck supple.   Cardiovascular: Normal rate, regular rhythm, normal heart sounds and intact distal pulses.    Pulmonary/Chest: Effort normal and breath sounds normal. No respiratory distress.   Abdominal: Soft. Bowel sounds are normal. Exhibits no distension and no mass. There is no tenderness. There is no rebound and no guarding.   Musculoskeletal: Patient has tenderness about the proximal humerus on the right side.  Pain with any movement.  Elbow is nontender wrist nontender circulation sensorimotor function intact     neurological: Alert and oriented to person, place, and time. Normal reflexes. No cranial nerve deficit. No motor os sensory defecits noted Coordination normal.   Skin: Skin is warm and dry.   Psychiatric: Normal mood and affect. Behavior is normal. Judgment and thought content normal.   Nursing note and vitals reviewed.        ED Course   Labs Reviewed - No data to display                   MDM      Use of independent historian: EMS provides history RN did speak with patient's daughter over the phone.    I personally reviewed and interpreted the images : Proximal humerus fracture noted  XR SHOULDER, COMPLETE (MIN 2 VIEWS), RIGHT (CPT=73030)    Result Date: 5/3/2024  CONCLUSION:  Comminuted nondisplaced impacted proximal right humeral fracture.    Dictated by (CST): Ari Hernandez MD on 5/03/2024 at 1:31 PM     Finalized by (CST): Ari Hernandez MD on 5/03/2024 at 1:32 PM           Vitals:    05/03/24 1229 05/03/24 1330   BP:  134/65   Pulse: 71 67   Resp: 24 22   SpO2: 96% 99%   Weight: 72.6 kg    Height: 165.1 cm (5' 5\")      *I personally reviewed and interpreted all ED vitals.    Pulse Ox: 96%, Room air, Normal         Differential Diagnosis/ Diagnostic Considerations: Right shoulder injury after fall.  Consider fracture  consider dislocation consider contusion    Medical Record Review: I personally reviewed available prior medical records for any recent pertinent discharge summaries, testing, and procedures and reviewed those reports and found immediate care visit 10/9/2023 for her arm injury notes reviewed..    Complicating Factors: The patient already has arthritis atrophic gastritis diverticulosis esophageal reflux high cholesterol internal hemorrhoids and visual impairment which contribute to the complexity of this ED evaluation.    Social determinants of health:    Prescription drug management:      Shared Decision Making:    ED Course: Patient did have some relief with medicines given here shoulder immobilizer applied.  Importance of follow-up discussed with both patient's daughter and grandson    Discussion of management with other healthcare providers:    Condition upon leaving the department: Stable                                     Medical Decision Making      Disposition and Plan     Clinical Impression:  1. Closed fracture of proximal end of right humerus, unspecified fracture morphology, initial encounter         Disposition:  Discharge  5/3/2024  2:28 pm    Follow-up:  Emmy Gonzalez MD  [address] [facility] [id number] [phone]    Follow up      Ilya Mccormick MD  [address] [facility] [address]  [facility] [id number] [phone]    Follow up      We recommend that you schedule follow up care with a primary care provider within the next three months to obtain basic health screening including reassessment of your blood pressure.      Medications Prescribed:  Current Discharge Medication List        START taking these medications    Details   HYDROcodone-acetaminophen 5-325 MG Oral Tab Take 1 tablet by mouth every 4 (four) hours as needed for Pain.  Qty: 12 tablet, Refills: 0    Associated Diagnoses: Closed fracture of proximal end of right humerus, unspecified fracture morphology, initial encounter

## 2024-05-03 NOTE — ED QUICK NOTES
Patient safe to discharge home per ED Provider. Discharge education provided, including follow up instructions. IV removed by this RN. Patients family verbalizes understanding.

## 2024-05-03 NOTE — ED INITIAL ASSESSMENT (HPI)
Patient arrives via EMS with reports of an unwitnessed fall. Bystanders called EMS. 30mcg of fentanyl given en route.  Unknown LOC. - Thinners per patient family. C/o R shoulder pain. + R radial pulse.   Unable to use language line for triage. Family en route.

## 2024-05-06 ENCOUNTER — OFFICE VISIT (OUTPATIENT)
Dept: ORTHOPEDICS CLINIC | Facility: CLINIC | Age: 79
End: 2024-05-06

## 2024-05-06 ENCOUNTER — HOSPITAL ENCOUNTER (OUTPATIENT)
Dept: GENERAL RADIOLOGY | Facility: HOSPITAL | Age: 79
Discharge: HOME | End: 2024-05-06
Attending: ORTHOPAEDIC SURGERY
Payer: MEDICAID

## 2024-05-06 DIAGNOSIS — S49.91XA RIGHT SHOULDER INJURY, INITIAL ENCOUNTER: ICD-10-CM

## 2024-05-06 DIAGNOSIS — S42.201G: Primary | ICD-10-CM

## 2024-05-06 DIAGNOSIS — M80.021G AGE-RELATED OSTEOPOROSIS WITH CURRENT PATHOLOGICAL FRACTURE OF RIGHT HUMERUS WITH DELAYED HEALING: ICD-10-CM

## 2024-05-06 PROCEDURE — 73020 X-RAY EXAM OF SHOULDER: CPT | Performed by: ORTHOPAEDIC SURGERY

## 2024-05-06 RX ORDER — GUARN/MA-HUANG/P.GIN/S.GINSENG
1 TABLET ORAL 2 TIMES DAILY WITH MEALS
Qty: 60 TABLET | Refills: 0 | Status: SHIPPED | OUTPATIENT
Start: 2024-05-06

## 2024-05-06 RX ORDER — MULTIVIT-MIN/FA/LYCOPEN/LUTEIN .4-300-25
1 TABLET ORAL DAILY
Qty: 60 TABLET | Refills: 0 | Status: SHIPPED | OUTPATIENT
Start: 2024-05-06

## 2024-05-06 NOTE — H&P
NURSING INTAKE COMMENTS:   Chief Complaint   Patient presents with    Shoulder Injury     Maltese speaking patient in with her daughter to translate. ER follow up for Right shoulder fracture due to a fall on 5/3. Patient rates her pain 10/10 at this time. X-Rays were done on 5/3       HPI: This 79 year old right-hand-dominant female presents today with her daughter who translated.  She was in a small garden by her apartment building and tripped and fell.  She has a right proximal humerus fracture with very slight varus angulation.  She has a sling already from the emergency room.  She denies numbness or tingling or other injuries.    Medical history is significant for early Alzheimer's.  She leads a sedentary lifestyle.  She lives by herself in an apartment with an elevator.  She is retired.  She likes to garden.  She denies diabetes.  There is Alzheimer's as far as diagnoses.  She denies numbness and tingling.    Past Medical History:    Arthritis    Atrophic gastritis    Diverticulosis large intestine w/o perforation or abscess w/o bleeding    Diverticulosis of large intestine    Esophageal reflux    Gastropathy    High cholesterol    Internal hemorrhoids without complication    Visual impairment     Past Surgical History:   Procedure Laterality Date    Cholecystectomy      Colonoscopy N/A 6/20/2018    Procedure: COLONOSCOPY;  Surgeon: Linda Espinal MD;  Location: [facility] ENDO    Colonoscopy      Upper gi endoscopy,exam       Current Outpatient Medications   Medication Sig Dispense Refill    Calcium 600-5 MG-MCG Oral Tab Take 1 tablet by mouth 2 (two) times daily with meals. 60 tablet 0    Multiple Vitamins-Minerals (CEROVITE SENIOR) Oral Tab Take 1 tablet by mouth daily. 60 tablet 0    HYDROcodone-acetaminophen 5-325 MG Oral Tab Take 1 tablet by mouth every 4 (four) hours as needed for Pain. 12 tablet 0    clotrimazole 1 % External Cream Topically bid 40 g 3    donepezil 10 MG Oral Tab Take 1 tablet  (10 mg total) by mouth nightly. 90 tablet 3    ergocalciferol 1.25 MG (25650 UT) Oral Cap Take 1 capsule (50,000 Units total) by mouth once a week. 12 capsule 3    simvastatin 20 MG Oral Tab Take 1 tablet (20 mg total) by mouth nightly. 90 tablet 3    aspirin 81 MG Oral Tab Take 1 tablet (81 mg total) by mouth daily.      triamcinolone acetonide 0.1 % External Cream Apply topically 2 (two) times daily as needed. 15 g 1    acetaminophen 325 MG Oral Tab Take 1 tablet (325 mg total) by mouth every 6 (six) hours as needed for Pain.       No Known Allergies  Family History   Problem Relation Age of Onset    No Known Problems Father     No Known Problems Mother     Diabetes Neg     Glaucoma Neg     Macular degeneration Neg      No family Hx of DVT/PE    Social History     Occupational History    Not on file   Tobacco Use    Smoking status: Never    Smokeless tobacco: Never   Substance and Sexual Activity    Alcohol use: No    Drug use: No    Sexual activity: Not on file        Review of Systems:  GENERAL: feels generally well, no significant weight loss or weight gain  SKIN: no ulcerated or worrisome skin lesions  EYES:denies blurred vision or double vision  HEENT: denies new nasal congestion, sinus pain or ST  LUNGS: denies shortness of breath  CARDIOVASCULAR: denies chest pain  GI: no hematemesis, no worsening heartburn, no diarrhea  : no dysuria, no blood in urine, no difficulty urinating, no incontinence  MUSCULOSKELETAL: no other musculoskeletal complaints other than in HPI  NEURO: no numbness or tingling, no weakness or balance disorder  PSYCHE: no depression or anxiety  HEMATOLOGIC: no hx of blood dyscrasia, no Hx DVT/PE  ENDOCRINE: no thyroid or diabetes issues  ALL/ASTHMA: no new hx of severe allergy or asthma    Physical Examination:    There were no vitals taken for this visit.  Constitutional: appears well hydrated, alert and responsive, no acute distress noted  Extremities: Small bruise on the anterior  upper arm right side.  Tenderness to the right proximal humerus only and not the elbow or hand or wrist.  Musculoskeletal: Full range of motion right elbow, wrist, and fingers.  No acromioclavicular or sternoclavicular tenderness.  Neurological: Normal motor and sensory right hand and fingers.  She did well with Codman's exercises.    Imaging: X-rays 5/4/2024 and today show a minimally displaced right proximal humerus fracture which I believe to be in acceptable alignment for age and activity level.      XR SHOULDER, COMPLETE (MIN 2 VIEWS), RIGHT (CPT=73030)    Result Date: 5/3/2024  PROCEDURE: XR SHOULDER, COMPLETE (MIN 2 VIEWS), RIGHT (CPT=73030)  COMPARISON: None.  INDICATIONS: Right shoulder pain post fall.  TECHNIQUE: 3 views were obtained.   FINDINGS:  BONES: There is a comminuted fracture in the right humeral head that demonstrates a dominant transverse component at the level of the surgical neck.  There is impaction at the fracture site resulting in posterior angulation of the humeral shaft.  There is probably extension to the greater tuberosity.  There is no definite articular extension or associated dislocation.  There is mild glenohumeral and acromioclavicular joint osteoarthritis. SOFT TISSUES: Negative. No visible soft tissue swelling. EFFUSION: None visible. OTHER: Negative.         CONCLUSION:  Comminuted nondisplaced impacted proximal right humeral fracture.    Dictated by (CST): Ari Hernandez MD on 5/03/2024 at 1:31 PM     Finalized by (CST): Ari Hernandez MD on 5/03/2024 at 1:32 PM             Lab Results   Component Value Date    WBC 4.4 12/23/2023    HGB 12.6 12/23/2023    .0 12/23/2023      Lab Results   Component Value Date    GLU 89 12/23/2023    BUN 14 12/23/2023    CREATSERUM 0.83 12/23/2023    GFRNAA 74 08/13/2021    GFRAA 85 08/13/2021        Assessment and Plan:  Diagnoses and all orders for this visit:    Traumatic closed fx of proximal humerus with minimal displacement,  right, with delayed healing, subsequent encounter  -     Calcium 600-5 MG-MCG Oral Tab; Take 1 tablet by mouth 2 (two) times daily with meals.  -     Multiple Vitamins-Minerals (CEROVITE SENIOR) Oral Tab; Take 1 tablet by mouth daily.    Right shoulder injury, initial encounter  -     Cancel: XR SHOULDER, COMPLETE (MIN 2 VIEWS), RIGHT (CPT=73030); Future  -     Calcium 600-5 MG-MCG Oral Tab; Take 1 tablet by mouth 2 (two) times daily with meals.  -     Multiple Vitamins-Minerals (CEROVITE SENIOR) Oral Tab; Take 1 tablet by mouth daily.    Age-related osteoporosis with current pathological fracture of right humerus with delayed healing  -     Calcium 600-5 MG-MCG Oral Tab; Take 1 tablet by mouth 2 (two) times daily with meals.  -     Multiple Vitamins-Minerals (CEROVITE SENIOR) Oral Tab; Take 1 tablet by mouth daily.        Assessment: Above diagnosis.  Fall was 5/3/2024.    Plan: She will start calcium plus vitamin D twice a day with breakfast and dinner and a multivitamin once a day with a meal.  This is for the osteoporosis portion of her injury.  She may continue her 50,000 units once weekly vitamin D as well.    We discussed Codman's exercises several times a day, sling wear during ambulation, pillow usage in bed, couch, chair, as well as hygiene with the shower.  If the fracture shifts significantly, she may require surgery.  For now we will see her in 2 weeks for x-rays of the right shoulder including Grayshey view and scapular Y.    Follow Up: No follow-ups on file.    Ilya Mccormick MD

## 2024-05-21 ENCOUNTER — HOSPITAL ENCOUNTER (OUTPATIENT)
Dept: GENERAL RADIOLOGY | Facility: HOSPITAL | Age: 79
Discharge: HOME | End: 2024-05-21
Attending: ORTHOPAEDIC SURGERY

## 2024-05-21 ENCOUNTER — OFFICE VISIT (OUTPATIENT)
Dept: ORTHOPEDICS CLINIC | Facility: CLINIC | Age: 79
End: 2024-05-21

## 2024-05-21 DIAGNOSIS — S42.201G: Primary | ICD-10-CM

## 2024-05-21 DIAGNOSIS — S42.201G: ICD-10-CM

## 2024-05-21 PROCEDURE — 73030 X-RAY EXAM OF SHOULDER: CPT | Performed by: ORTHOPAEDIC SURGERY

## 2024-05-21 PROCEDURE — 99024 POSTOP FOLLOW-UP VISIT: CPT

## 2024-05-21 NOTE — PROGRESS NOTES
NURSING INTAKE COMMENTS:   Chief Complaint   Patient presents with    Fracture     R shoulder fx f/u -  Pt states pain when moving arm up . No pain when arm is in sling. Some numbness in fingers. Pt here with her daughter to help translate.       HPI: This 79 year old female presents today with her daughter for translation.  Patient is approximately 2 weeks status post right proximal humerus fracture.  Patient reports that overall she feels like she is improving, but her arm still hurts whenever she moves it.  She is not taking anything for pain at this time.  She reports that when she keeps her arm in a sling for long periods of time her fingers do get tingly, when she moves it it disappears.  She did not endorse any numbness or tingling today.  Patient has been taking her calcium and vitamin D supplements.  She is also been doing Codman's exercises several times daily.    Past Medical History:    Arthritis    Atrophic gastritis    Diverticulosis large intestine w/o perforation or abscess w/o bleeding    Diverticulosis of large intestine    Esophageal reflux    Gastropathy    High cholesterol    Internal hemorrhoids without complication    Visual impairment     Past Surgical History:   Procedure Laterality Date    Cholecystectomy      Colonoscopy N/A 6/20/2018    Procedure: COLONOSCOPY;  Surgeon: Linda Espinal MD;  Location: [facility] ENDO    Colonoscopy      Upper gi endoscopy,exam       Current Outpatient Medications   Medication Sig Dispense Refill    Calcium 600-5 MG-MCG Oral Tab Take 1 tablet by mouth 2 (two) times daily with meals. 60 tablet 0    Multiple Vitamins-Minerals (CEROVITE SENIOR) Oral Tab Take 1 tablet by mouth daily. 60 tablet 0    clotrimazole 1 % External Cream Topically bid 40 g 3    donepezil 10 MG Oral Tab Take 1 tablet (10 mg total) by mouth nightly. 90 tablet 3    ergocalciferol 1.25 MG (92408 UT) Oral Cap Take 1 capsule (50,000 Units total) by mouth once a week. 12 capsule 3     simvastatin 20 MG Oral Tab Take 1 tablet (20 mg total) by mouth nightly. 90 tablet 3    aspirin 81 MG Oral Tab Take 1 tablet (81 mg total) by mouth daily.      triamcinolone acetonide 0.1 % External Cream Apply topically 2 (two) times daily as needed. 15 g 1    acetaminophen 325 MG Oral Tab Take 1 tablet (325 mg total) by mouth every 6 (six) hours as needed for Pain.       No Known Allergies  Family History   Problem Relation Age of Onset    No Known Problems Father     No Known Problems Mother     Diabetes Neg     Glaucoma Neg     Macular degeneration Neg      No family Hx of DVT/PE    Social History     Occupational History    Not on file   Tobacco Use    Smoking status: Never    Smokeless tobacco: Never   Substance and Sexual Activity    Alcohol use: No    Drug use: No    Sexual activity: Not on file        Review of Systems:  GENERAL: feels generally well, no significant weight loss or weight gain  SKIN: no ulcerated or worrisome skin lesions  EYES:denies blurred vision or double vision  HEENT: denies new nasal congestion, sinus pain or ST  LUNGS: denies shortness of breath  CARDIOVASCULAR: denies chest pain  GI: no hematemesis, no worsening heartburn, no diarrhea  : no dysuria, no blood in urine, no difficulty urinating, no incontinence  MUSCULOSKELETAL: no other musculoskeletal complaints other than in HPI  NEURO: no numbness or tingling, no weakness or balance disorder  PSYCHE: no depression or anxiety  HEMATOLOGIC: no hx of blood dyscrasia, no Hx DVT/PE  ENDOCRINE: no thyroid or diabetes issues  ALL/ASTHMA: no new hx of severe allergy or asthma    Physical Examination:    There were no vitals taken for this visit.  Constitutional: appears well hydrated, alert and responsive, no acute distress noted  Extremities: Right upper extremity has mild asymmetric swelling along with some bruising.  Musculoskeletal: No tenderness to right elbow.  Patient has full range of motion to both right elbow and wrist.  No  weakness to median, ulnar, radial nerves.  Neurological: Normal motor and sensory functions.    Imaging: Dr. Mccormick reviewed x-rays taken in office today.  X-rays show a healing minimally displaced right proximal humerus fracture which continues to be in acceptable alignment.  It did not show any further displacement from previous x-rays.      XR SHOULDER, (1 VIEW), RIGHT (CPT=73020)    Result Date: 5/7/2024  PROCEDURE: XR SHOULDER, (1 VIEW), RIGHT (CPT=73020)  COMPARISON: [facility], XR SHOULDER, COMPLETE (MIN 2 VIEWS), RIGHT (CPT=73030), 5/03/2024, 12:58 PM.  INDICATIONS: right shoulder pain post fall  TECHNIQUE: Single-glenohumeral joint view were obtained.   FINDINGS:  BONES: Minimally impacted humeral head surgical neck fracture unchanged.  Position alignment is near anatomic.  Mild glenohumeral degeneration.  Hypertrophic degeneration right AC joint.  Multilevel thoracic spondylosis.  SOFT TISSUES: Negative. No visible soft tissue swelling. EFFUSION: None visible. OTHER: Negative.         CONCLUSION:  1. Minimally impacted humeral head surgical neck fracture deformity unchanged.     Dictated by (CST): Julito Blankenship MD on 5/07/2024 at 2:44 PM     Finalized by (CST): Julito Blankenship MD on 5/07/2024 at 2:46 PM          XR SHOULDER, COMPLETE (MIN 2 VIEWS), RIGHT (CPT=73030)    Result Date: 5/3/2024  PROCEDURE: XR SHOULDER, COMPLETE (MIN 2 VIEWS), RIGHT (CPT=73030)  COMPARISON: None.  INDICATIONS: Right shoulder pain post fall.  TECHNIQUE: 3 views were obtained.   FINDINGS:  BONES: There is a comminuted fracture in the right humeral head that demonstrates a dominant transverse component at the level of the surgical neck.  There is impaction at the fracture site resulting in posterior angulation of the humeral shaft.  There is probably extension to the greater tuberosity.  There is no definite articular extension or associated dislocation.  There is mild glenohumeral and acromioclavicular  joint osteoarthritis. SOFT TISSUES: Negative. No visible soft tissue swelling. EFFUSION: None visible. OTHER: Negative.         CONCLUSION:  Comminuted nondisplaced impacted proximal right humeral fracture.    Dictated by (CST): Ari Hernandez MD on 5/03/2024 at 1:31 PM     Finalized by (CST): Ari Hernandez MD on 5/03/2024 at 1:32 PM             Lab Results   Component Value Date    WBC 4.4 12/23/2023    HGB 12.6 12/23/2023    .0 12/23/2023      Lab Results   Component Value Date    GLU 89 12/23/2023    BUN 14 12/23/2023    CREATSERUM 0.83 12/23/2023    GFRNAA 74 08/13/2021    GFRAA 85 08/13/2021        Assessment and Plan:  Diagnoses and all orders for this visit:    Traumatic closed fx of proximal humerus with minimal displacement, right, with delayed healing, subsequent encounter  -     XR SHOULDER, COMPLETE (MIN 2 VIEWS), RIGHT (CPT=73030); Future        Assessment: As above    Plan: Discussed with the patient that she should continue doing Codman's exercises as well as make sure that she works on her range of motion of her elbow so it does not get stiff.  She should continue taking her calcium and vitamin D.  She should still not lift, push, or pull with her right arm.  We will see her in 2 weeks for repeat x-rays.    Follow Up: No follow-ups on file.    MEGAN Roque

## 2024-06-04 ENCOUNTER — OFFICE VISIT (OUTPATIENT)
Dept: ORTHOPEDICS CLINIC | Facility: CLINIC | Age: 79
End: 2024-06-04
Payer: MEDICAID

## 2024-06-04 ENCOUNTER — HOSPITAL ENCOUNTER (OUTPATIENT)
Dept: GENERAL RADIOLOGY | Facility: HOSPITAL | Age: 79
Discharge: HOME | End: 2024-06-04
Attending: ORTHOPAEDIC SURGERY
Payer: MEDICAID

## 2024-06-04 DIAGNOSIS — S42.201G: Primary | ICD-10-CM

## 2024-06-04 DIAGNOSIS — Z47.89 ORTHOPEDIC AFTERCARE: ICD-10-CM

## 2024-06-04 PROCEDURE — 99024 POSTOP FOLLOW-UP VISIT: CPT

## 2024-06-04 PROCEDURE — 73030 X-RAY EXAM OF SHOULDER: CPT | Performed by: ORTHOPAEDIC SURGERY

## 2024-06-04 NOTE — PROGRESS NOTES
NURSING INTAKE COMMENTS:   Chief Complaint   Patient presents with    Follow - Up     Right Shoulder Fx, 5/10 pain scale       HPI: This 79 year old female presents today with her daughter for translation.  Patient is approximately 4 weeks status post right proximal humerus fracture.  Patient is still having some pain throughout the day, but is not taking any pain medications.  She is continue to wear her arm in sling for long periods of time.  She still endorses some tingling in her fingers when she keeps her arm in a sling for too long, but when she takes it out the tingling improves.  She continues to take her calcium and vitamin D supplements.  She has been doing her Codman's exercises several times daily.  She has been careful to not lift, push, or pull with her right arm.    Past Medical History:    Arthritis    Atrophic gastritis    Diverticulosis large intestine w/o perforation or abscess w/o bleeding    Diverticulosis of large intestine    Esophageal reflux    Gastropathy    High cholesterol    Internal hemorrhoids without complication    Visual impairment     Past Surgical History:   Procedure Laterality Date    Cholecystectomy      Colonoscopy N/A 6/20/2018    Procedure: COLONOSCOPY;  Surgeon: Linda Espinal MD;  Location: [facility] ENDO    Colonoscopy      Upper gi endoscopy,exam       Current Outpatient Medications   Medication Sig Dispense Refill    Calcium 600-5 MG-MCG Oral Tab Take 1 tablet by mouth 2 (two) times daily with meals. 60 tablet 0    Multiple Vitamins-Minerals (CEROVITE SENIOR) Oral Tab Take 1 tablet by mouth daily. 60 tablet 0    clotrimazole 1 % External Cream Topically bid 40 g 3    donepezil 10 MG Oral Tab Take 1 tablet (10 mg total) by mouth nightly. 90 tablet 3    ergocalciferol 1.25 MG (75353 UT) Oral Cap Take 1 capsule (50,000 Units total) by mouth once a week. 12 capsule 3    simvastatin 20 MG Oral Tab Take 1 tablet (20 mg total) by mouth nightly. 90 tablet 3    aspirin  81 MG Oral Tab Take 1 tablet (81 mg total) by mouth daily.      triamcinolone acetonide 0.1 % External Cream Apply topically 2 (two) times daily as needed. 15 g 1    acetaminophen 325 MG Oral Tab Take 1 tablet (325 mg total) by mouth every 6 (six) hours as needed for Pain.       No Known Allergies  Family History   Problem Relation Age of Onset    No Known Problems Father     No Known Problems Mother     Diabetes Neg     Glaucoma Neg     Macular degeneration Neg      No family Hx of DVT/PE    Social History     Occupational History    Not on file   Tobacco Use    Smoking status: Never    Smokeless tobacco: Never   Substance and Sexual Activity    Alcohol use: No    Drug use: No    Sexual activity: Not on file        Review of Systems:  GENERAL: feels generally well, no significant weight loss or weight gain  SKIN: no ulcerated or worrisome skin lesions  EYES:denies blurred vision or double vision  HEENT: denies new nasal congestion, sinus pain or ST  LUNGS: denies shortness of breath  CARDIOVASCULAR: denies chest pain  GI: no hematemesis, no worsening heartburn, no diarrhea  : no dysuria, no blood in urine, no difficulty urinating, no incontinence  MUSCULOSKELETAL: no other musculoskeletal complaints other than in HPI  NEURO: no numbness or tingling, no weakness or balance disorder  PSYCHE: no depression or anxiety  HEMATOLOGIC: no hx of blood dyscrasia, no Hx DVT/PE  ENDOCRINE: no thyroid or diabetes issues  ALL/ASTHMA: no new hx of severe allergy or asthma    Physical Examination:    There were no vitals taken for this visit.  Constitutional: appears well hydrated, alert and responsive, no acute distress noted  Extremities: Right upper extremity has mild asymmetric swelling.  No bruising today.  Musculoskeletal: No tenderness to right elbow.  Patient has full range of motion to her elbow and wrist.  Able to do passive wall crawls to approximately 80 degrees.  Neurological: No weakness to median, ulnar, radial  nerves.  Normal motor and sensory functions.    Imaging: Dr. Mccormick reviewed x-rays taken in office today.  X-rays show a healing minimally displaced right proximal humerus fracture which continues to be acceptable alignment.      XR SHOULDER, COMPLETE (MIN 2 VIEWS), RIGHT (CPT=73030)    Result Date: 6/4/2024  PROCEDURE: XR SHOULDER, COMPLETE (MIN 2 VIEWS), RIGHT (CPT=73030)  COMPARISON: [facility] 2nd Floor, XR SHOULDER, COMPLETE (MIN 2 VIEWS), RIGHT (CPT=73030), 5/21/2024, 5:24 PM.  [facility] 2nd Floor, XR SHOULDER, (1 VIEW), RIGHT (CPT=73020), 5/06/2024, 3:48 PM.  [facility], XR SHOULDER, COMPLETE (MIN 2 VIEWS), RIGHT (CPT=73030), 5/03/2024, 12:58 PM.  INDICATIONS: Right humerus fracture; orthopedic aftercare.  TECHNIQUE: 2 views were obtained.   FINDINGS:  BONES: A healing subacute comminuted slightly impacted fracture of the surgical neck of the humerus is apparent. Mild periosteal reaction is seen along the fracture margins, but the fracture line remains visible. The acromioclavicular and glenohumeral joints are congruent; mild degeneration is present. No suspicious osseous lesions are detected. SOFT TISSUES: Negative for visible soft tissue swelling or radiopaque foreign body.  EFFUSION: Caudal displacement of humeral suggests underlying hemarthrosis.  OTHER: Visualized portions of the ipsilateral hemithorax are grossly unremarkable.          CONCLUSION:  1. Healing subacute comminuted impacted fracture of the proximal right humerus without displacement.  2. Suspected right shoulder joint effusion/hemarthrosis.  3. Primary osteoarthritic changes the right shoulder are evident    Dictated by (CST): Yahir Harvey MD on 6/04/2024 at 9:55 AM     Finalized by (CST): Yahir Harvey MD on 6/04/2024 at 9:58 AM          XR SHOULDER, COMPLETE (MIN 2 VIEWS), RIGHT (CPT=73030)    Result Date: 5/22/2024  PROCEDURE: XR SHOULDER, COMPLETE (MIN 2  VIEWS), RIGHT (CPT=73030)  COMPARISON: [facility] 2nd Floor, XR SHOULDER, (1 VIEW), RIGHT (CPT=73020), 5/06/2024, 3:48 PM.  INDICATIONS: Follow-up for right proximal humerus fracture.  TECHNIQUE: AP and lateral views were obtained.   FINDINGS: Findings in CONCLUSION         CONCLUSION:  1. Healing subacute impacted fracture involving the humeral head and humeral neck.  Position and alignment is near anatomic. 2. Mild inferior pseudosubluxation may be related to underlying shoulder joint effusion.  3. Mild glenohumeral degeneration.  Hypertrophic degeneration right AC joint.    Dictated by (CST): Julito Blankenship MD on 5/22/2024 at 10:44 AM     Finalized by (CST): Julito Blankenship MD on 5/22/2024 at 10:45 AM          XR SHOULDER, (1 VIEW), RIGHT (CPT=73020)    Result Date: 5/7/2024  PROCEDURE: XR SHOULDER, (1 VIEW), RIGHT (CPT=73020)  COMPARISON: [facility], XR SHOULDER, COMPLETE (MIN 2 VIEWS), RIGHT (CPT=73030), 5/03/2024, 12:58 PM.  INDICATIONS: right shoulder pain post fall  TECHNIQUE: Single-glenohumeral joint view were obtained.   FINDINGS:  BONES: Minimally impacted humeral head surgical neck fracture unchanged.  Position alignment is near anatomic.  Mild glenohumeral degeneration.  Hypertrophic degeneration right AC joint.  Multilevel thoracic spondylosis.  SOFT TISSUES: Negative. No visible soft tissue swelling. EFFUSION: None visible. OTHER: Negative.         CONCLUSION:  1. Minimally impacted humeral head surgical neck fracture deformity unchanged.     Dictated by (CST): Julito Blankenship MD on 5/07/2024 at 2:44 PM     Finalized by (CST): Julito Blankenship MD on 5/07/2024 at 2:46 PM             Lab Results   Component Value Date    WBC 4.4 12/23/2023    HGB 12.6 12/23/2023    .0 12/23/2023      Lab Results   Component Value Date    GLU 89 12/23/2023    BUN 14 12/23/2023    CREATSERUM 0.83 12/23/2023    GFRNAA 74 08/13/2021    GFRAA 85 08/13/2021         Assessment and Plan:  Diagnoses and all orders for this visit:    Traumatic closed fx of proximal humerus with minimal displacement, right, with delayed healing, subsequent encounter  -     Physical Therapy Referral - [facility]    Orthopedic aftercare  -     XR SHOULDER, COMPLETE (MIN 2 VIEWS), RIGHT (CPT=73030); Future  -     Physical Therapy Referral - [facility]        Assessment: As above    Plan: Today demonstrated with the patient to do exercises which were passive wall crawls and internal range of motion.  Discussed that she should add these daily with her Codman's exercises.  Also discussed with the patient that we will add physical therapy.  Discussed with her that she should still not lift, push, or pull with her right arm.  Discussed that she should begin to wear her sling less.  Discussed that if she is at home she can wear out of the sling if she is careful.    Discussed the patient she should continue taking her calcium and vitamin D supplements.    We will see her in 3 weeks for repeat x-rays.    Follow Up: No follow-ups on file.    MEGAN Roque

## 2024-07-01 ENCOUNTER — HOSPITAL ENCOUNTER (OUTPATIENT)
Dept: GENERAL RADIOLOGY | Facility: HOSPITAL | Age: 79
Discharge: HOME | End: 2024-07-01
Payer: MEDICAID

## 2024-07-01 ENCOUNTER — OFFICE VISIT (OUTPATIENT)
Dept: ORTHOPEDICS CLINIC | Facility: CLINIC | Age: 79
End: 2024-07-01
Payer: MEDICAID

## 2024-07-01 ENCOUNTER — OFFICE VISIT (OUTPATIENT)
Dept: PHYSICAL THERAPY | Age: 79
End: 2024-07-01
Payer: MEDICAID

## 2024-07-01 DIAGNOSIS — S42.201G: Primary | ICD-10-CM

## 2024-07-01 DIAGNOSIS — M25.539 PAIN IN WRIST, UNSPECIFIED LATERALITY: ICD-10-CM

## 2024-07-01 DIAGNOSIS — Z47.89 ORTHOPEDIC AFTERCARE: ICD-10-CM

## 2024-07-01 DIAGNOSIS — M65.4 DE QUERVAIN'S TENOSYNOVITIS, RIGHT: ICD-10-CM

## 2024-07-01 DIAGNOSIS — Z47.89 ORTHOPEDIC AFTERCARE: Primary | ICD-10-CM

## 2024-07-01 DIAGNOSIS — S42.201G: ICD-10-CM

## 2024-07-01 PROCEDURE — 99213 OFFICE O/P EST LOW 20 MIN: CPT

## 2024-07-01 PROCEDURE — 97140 MANUAL THERAPY 1/> REGIONS: CPT

## 2024-07-01 PROCEDURE — 97161 PT EVAL LOW COMPLEX 20 MIN: CPT

## 2024-07-01 PROCEDURE — 73110 X-RAY EXAM OF WRIST: CPT

## 2024-07-01 PROCEDURE — 73030 X-RAY EXAM OF SHOULDER: CPT

## 2024-07-01 PROCEDURE — 97110 THERAPEUTIC EXERCISES: CPT

## 2024-07-01 NOTE — PROGRESS NOTES
P.T. EVALUATION:   Referring Physician: Dr. Juárez  Diagnosis:  Orthopedic aftercare (Z47.89)  Traumatic closed fx of proximal humerus with minimal displacement, right, with delayed healing, subsequent encounter (S42.201G)      Date of Onset: May 3, 2024 Date of Service: 7/1/2024     PATIENT SUMMARY   Patient verbalized consent for Physical Therapy evaluation and treatment.  Giovanna Taylor is a 79 year old y/o female who presents to therapy today due to traumatic closed fracture of proximal humerus  Pt describes pain level 8-9/10 at worst  History of current condition: Patient fell and injured her right shoulder.  Pt found to have fracture of proximal humerus.  Pt was in a sling initially as recommended by MD, but no longer has to wear.  No surgical intervention performed.   Current functional limitations include reaching, lifting, ADLs, sleeping, pushing, pulling, carrying  Giovanna describes prior level of function independent without functional limitations. Pt goals include regain shoulder mobility and strength; return to prior level of function  Past medical history was reviewed with Giovanna. Patient   has a past medical history of Arthritis, Atrophic gastritis (06/20/2018), Diverticulosis large intestine w/o perforation or abscess w/o bleeding (06/20/2018), Diverticulosis of large intestine, Esophageal reflux, Gastropathy (06/20/2018), High cholesterol, Internal hemorrhoids without complication (06/20/2018), and Visual impairment.   Social hx:  Patient is right handed.   Language: [place] African; declined language line; Daughter Melina present to translate      ASSESSMENT:   Patient presents to PT clinic due to traumatic closed fracture of proximal humerus with minimal displacement of right upper extremity, with delayed healing.  Patient demos decreased right shoulder ROM, decreased RUE strength, altered posture, tension R shoulder and neck musculature, decreased cervical ROM, altered RUE sensation, and pain.  These  impairments are functionally limiting pt's ability to lift, reach, sleep, push, pull, carry, and perform ADLs.  L[place] would benefit from skilled Physical Therapy to address the above impairments to regain shoulder mobility and strength; return to prior level of function.      Precautions:  none     OBJECTIVE:   Observation: pt ambulates into clinic independently    Palpation:   (+) tenderness right upper trap, R biceps, R deltoid muscle    Sensation: slight numbness to right handed     AROM:   Cervical ROM:  Flx: Min loss/WNL (slight pain)  Ext: Min loss (slight pain)  Rot: R mod loss (pain), L min loss  Lat flx: R min loss, L mod loss (pain)    Shoulder ROM (sitting):  Flx: R 30 deg, L 140 deg  Abd: R 45 deg, L 150 deg  ER/HBH: R ear level, L C8 spinous process  IR/HBB: R ischial tuberosity, L T6 spinous process    Shoulder ROM (supine):  Flx: R 90 deg, L 155 deg  Abd: R 80 deg, L 155 deg  ER: R 0 deg, L 80 deg  IR: R 80 deg, L 90 deg    Accessory motion:   Not tested today    Strength/MMT:   Shoulder flx: R 2+/5, L 4/5  Shoulder abd: R 2+/5,  L 4+/5  Shoulder ER: R 2+/5, L 5/5  Shoulder IR: R 4+/5, L 5/5  Biceps: R 4+/5, L 4+/5  Triceps: R 4+/5, L 4/5    Posture: forward rounded shoulders; guarded upper trunk posture    Gait: pt ambulates into clinic independently    Fall History for the past 2 years:  yes    Functional Outcome Measure: QuickDash: 84.09%     Today’s Treatment and Response:  Patient education provided on PT eval findings, treatment plan, goals, HEP  Patient received there ex, HEP, STM R upper trap, R detoid/biceps muscle, there ex  Charges: PT Eval, TE 1 Man 1      Total Timed Treatment: 40 min     Total Treatment Time: 45 min      PT Eval Low Complexity     PLAN OF CARE:    Goals:    1- Pt will be I with maintenance and progression of HEP  2- Pt will demo increase in R shoulder ROM to equal L to ease ability for pt to reach  3- Pt will demo increase in RUE strength to 4+/5 or better to ease  ability for pt to lift  4- Pt will report 0/10 pain or less with ADLs    Frequency / Duration: Patient will be seen for 2x/week or a total of 14-16 visits over a 90 day period. Treatment will include: Modalities prn, manual therapy, therapeutic exercises, therapeutic activity, and instructions on a home program.     Education or treatment limitation: Communication  Rehab Potential:good    Patient/Family was advised of these findings, precautions, and treatment options and has agreed to actively participate in planning and for this course of care.    Thank you for your referral. Please co-sign or sign and return this letter via fax as soon as possible to [phone]. If you have any questions, please contact me at Dept: [phone]    Sincerely,  Electronically signed by therapist: Ana Dolan PT, DPT    Physician's certification required: Yes  I certify the need for these services furnished under this plan of treatment and while under my care.    X___________________________________________________ Date____________________    Certification From: 7/1/2024  To:9/29/2024

## 2024-07-01 NOTE — PATIENT INSTRUCTIONS
Shoulder extension AROM 2x/day  Shoulder flexion with wand 2x/day  Scap squeezes 2 x 10 2x/day  Seated c-rotation 10x ea 3x/day  Seated c-lateral flexion 10x ea 3x/day

## 2024-07-01 NOTE — PROGRESS NOTES
NURSING INTAKE COMMENTS:   Chief Complaint   Patient presents with    Fracture     Syriac speaking pt here w/ daughter to translate- R shoulder f/u- rates pain 8/10 on and off    Wrist Pain     Consult- R wrist- onset- 2 wks ago- per pt , it's possible she hurt her wrist during R shoulder injury she had - rates pain 5/10 on and off       HPI: This 79 year old female presents today with her daughter for translation approximately 7 weeks status post right proximal humerus fracture.  Patient reports that overall she feels like her pain is improving, but when she does certain movements such as constant exercises, internal rotation, or passive wall crawls she does have pain.  She denies any numbness or tingling.  She has decreased using the sling.  She continues to take her calcium and vitamin D supplements.  She reports that she has not been lifting, pushing, or pulling with her right arm.  Patient has her first physical therapy session scheduled for today.    Patient also presents for right wrist pain.  She reports that since she was so focused on her right upper extremity pain she did not pay attention to her wrist pain.  She noted that when she fell she did catch herself with her hand extended outward.  She denies any numbness or tingling in her hand.    Past Medical History:    Arthritis    Atrophic gastritis    Diverticulosis large intestine w/o perforation or abscess w/o bleeding    Diverticulosis of large intestine    Esophageal reflux    Gastropathy    High cholesterol    Internal hemorrhoids without complication    Visual impairment     Past Surgical History:   Procedure Laterality Date    Cholecystectomy      Colonoscopy N/A 6/20/2018    Procedure: COLONOSCOPY;  Surgeon: Linda Espinal MD;  Location: [facility]    Colonoscopy      Upper gi endoscopy,exam       Current Outpatient Medications   Medication Sig Dispense Refill    Calcium 600-5 MG-MCG Oral Tab Take 1 tablet by mouth 2 (two) times  daily with meals. 60 tablet 0    Multiple Vitamins-Minerals (CEROVITE SENIOR) Oral Tab Take 1 tablet by mouth daily. 60 tablet 0    clotrimazole 1 % External Cream Topically bid 40 g 3    donepezil 10 MG Oral Tab Take 1 tablet (10 mg total) by mouth nightly. 90 tablet 3    ergocalciferol 1.25 MG (34610 UT) Oral Cap Take 1 capsule (50,000 Units total) by mouth once a week. 12 capsule 3    simvastatin 20 MG Oral Tab Take 1 tablet (20 mg total) by mouth nightly. 90 tablet 3    aspirin 81 MG Oral Tab Take 1 tablet (81 mg total) by mouth daily.      triamcinolone acetonide 0.1 % External Cream Apply topically 2 (two) times daily as needed. 15 g 1    acetaminophen 325 MG Oral Tab Take 1 tablet (325 mg total) by mouth every 6 (six) hours as needed for Pain.       No Known Allergies  Family History   Problem Relation Age of Onset    No Known Problems Father     No Known Problems Mother     Diabetes Neg     Glaucoma Neg     Macular degeneration Neg      No family Hx of DVT/PE    Social History     Occupational History    Not on file   Tobacco Use    Smoking status: Never    Smokeless tobacco: Never   Substance and Sexual Activity    Alcohol use: No    Drug use: No    Sexual activity: Not on file        Review of Systems:  GENERAL: feels generally well, no significant weight loss or weight gain  SKIN: no ulcerated or worrisome skin lesions  EYES:denies blurred vision or double vision  HEENT: denies new nasal congestion, sinus pain or ST  LUNGS: denies shortness of breath  CARDIOVASCULAR: denies chest pain  GI: no hematemesis, no worsening heartburn, no diarrhea  : no dysuria, no blood in urine, no difficulty urinating, no incontinence  MUSCULOSKELETAL: no other musculoskeletal complaints other than in HPI  NEURO: no numbness or tingling, no weakness or balance disorder  PSYCHE: no depression or anxiety  HEMATOLOGIC: no hx of blood dyscrasia, no Hx DVT/PE  ENDOCRINE: no thyroid or diabetes issues  ALL/ASTHMA: no new hx of  severe allergy or asthma    Physical Examination:    There were no vitals taken for this visit.  Constitutional: appears well hydrated, alert and responsive, no acute distress noted  Extremities: Right upper extremity has minimal asymmetric swelling.  No bruising.  Musculoskeletal: No tenderness to right elbow.  Patient has full range of motion to elbow and wrist.  Positive Finkelstein on right wrist.  Tenderness to de Quervain's tendon.  Patient able to do passive wall crawls to approximately 90 degrees.  Neurological: No weakness to median, ulnar, or radial nerves.  Motor and sensory function intact.    Imaging: Dr. Domingo seen I both reviewed x-rays taken in office today.  X-rays show healing minimally placed right proximal humerus fracture which continues to be an acceptable alignment.    No acute osseous injury noted and right wrist x-ray.      XR SHOULDER, COMPLETE (MIN 2 VIEWS), RIGHT (CPT=73030)    Result Date: 6/4/2024  PROCEDURE: XR SHOULDER, COMPLETE (MIN 2 VIEWS), RIGHT (CPT=73030)  COMPARISON: [facility] 2nd Floor, XR SHOULDER, COMPLETE (MIN 2 VIEWS), RIGHT (CPT=73030), 5/21/2024, 5:24 PM.  [facility] 2nd Floor, XR SHOULDER, (1 VIEW), RIGHT (CPT=73020), 5/06/2024, 3:48 PM.  [facility], XR SHOULDER, COMPLETE (MIN 2 VIEWS), RIGHT (CPT=73030), 5/03/2024, 12:58 PM.  INDICATIONS: Right humerus fracture; orthopedic aftercare.  TECHNIQUE: 2 views were obtained.   FINDINGS:  BONES: A healing subacute comminuted slightly impacted fracture of the surgical neck of the humerus is apparent. Mild periosteal reaction is seen along the fracture margins, but the fracture line remains visible. The acromioclavicular and glenohumeral joints are congruent; mild degeneration is present. No suspicious osseous lesions are detected. SOFT TISSUES: Negative for visible soft tissue swelling or radiopaque foreign body.  EFFUSION: Caudal displacement of humeral  suggests underlying hemarthrosis.  OTHER: Visualized portions of the ipsilateral hemithorax are grossly unremarkable.          CONCLUSION:  1. Healing subacute comminuted impacted fracture of the proximal right humerus without displacement.  2. Suspected right shoulder joint effusion/hemarthrosis.  3. Primary osteoarthritic changes the right shoulder are evident    Dictated by (CST): Yahir Harvey MD on 6/04/2024 at 9:55 AM     Finalized by (CST): Yahir Harvey MD on 6/04/2024 at 9:58 AM             Lab Results   Component Value Date    WBC 4.4 12/23/2023    HGB 12.6 12/23/2023    .0 12/23/2023      Lab Results   Component Value Date    GLU 89 12/23/2023    BUN 14 12/23/2023    CREATSERUM 0.83 12/23/2023    GFRNAA 74 08/13/2021    GFRAA 85 08/13/2021        Assessment and Plan:  Diagnoses and all orders for this visit:    Traumatic closed fx of proximal humerus with minimal displacement, right, with delayed healing, subsequent encounter    Orthopedic aftercare  -     XR SHOULDER, COMPLETE (MIN 2 VIEWS), RIGHT (CPT=73030); Future    Pain in wrist, unspecified laterality  -     XR WRIST COMPLETE (MIN 3 VIEWS), RIGHT (CPT=73110); Future    De Quervain's tenosynovitis, right        Assessment: As above    Plan: Discussed with the patient that she should continue taking her calcium and multivitamin supplements.  She is starting her physical therapy today.  Discussed with her she should do her home exercises and therapy daily.  She should continue to not lift, push, or pull with her right arm.    For her right de Quervain's I discussed that we typically treat this with cortisone, but since she is only 7 weeks from a fracture we cannot do that today.  I offered physical therapy for this, and she declined saying that she wanted to focus on her right upper extremity.      We will see her in 3 weeks for repeat x-rays.    Follow Up: No follow-ups on file.    MEGAN Roque

## 2024-07-03 ENCOUNTER — OFFICE VISIT (OUTPATIENT)
Dept: PHYSICAL THERAPY | Age: 79
End: 2024-07-03
Payer: MEDICAID

## 2024-07-03 PROCEDURE — 97140 MANUAL THERAPY 1/> REGIONS: CPT

## 2024-07-03 PROCEDURE — 97110 THERAPEUTIC EXERCISES: CPT

## 2024-07-03 NOTE — PROGRESS NOTES
Diagnosis:  Orthopedic aftercare (Z47.89)  Traumatic closed fx of proximal humerus with minimal displacement, right, with delayed healing, subsequent encounter (S42.201G)             Next MD visit: none scheduled    Fall Risk: standard         Precautions: n/a          Medication Changes since last visit?: No    Subjective: Patient reports 7-8/10 shoulder pain with movement.        Objective:    7/3/2025:   Shoulder PROM (supine):  Shoulder flx: R 100 deg  Shoulder abd: R 95 deg  Shoulder ER: 10 deg     Date: 7/3/2024  Visit #: 2/10 ([facility]) exp. 8/30/2024   HEP   -   Therapeutic Exercise   X 35 minutes  - supine R shoulder PROM in all planes x 23 minutes  - supine R wrist flexion/extension 10x ea  - supine R yellow finger web 30x  - supine B shoulder flexion with wand 10x  - supine B chest press with wand 5x  - supine B shoulder ER AROM 10x  - supine R/L c-rotation 10x ea  - seated B table slides with towel 10x  - standing B shoulder extension 10x   Manual Therapy   - STM to R upper trap, R rhomboids, R mid trap, R teres m   x 8 minutes   Therapeutic Activity   -   Modalities                Assessment: Session focused on gentle shoulder PROM stretching as tolerated.        Plan: continue PT    Charges: Ex 2 Man 1    Total Timed Treatment: 43 min  Total Treatment Time: 43 min

## 2024-07-09 ENCOUNTER — OFFICE VISIT (OUTPATIENT)
Dept: PHYSICAL THERAPY | Age: 79
End: 2024-07-09
Payer: MEDICAID

## 2024-07-09 PROCEDURE — 97110 THERAPEUTIC EXERCISES: CPT

## 2024-07-09 NOTE — PROGRESS NOTES
Diagnosis:  Orthopedic aftercare (Z47.89)  Traumatic closed fx of proximal humerus with minimal displacement, right, with delayed healing, subsequent encounter (S42.201G)             Next MD visit: none scheduled    Fall Risk: standard         Precautions: n/a          Medication Changes since last visit?: No    Subjective: Patient reports 5/10 shoulder pain with movement.        Objective:    7/3/2025:   Shoulder PROM (supine):  Shoulder flx: R 100 deg  Shoulder abd: R 95 deg  Shoulder ER: 10 deg     Date: 7/9/2024  Visit #: 3/10 ([facility]) exp. 8/30/2024 Date: 7/3/2024  Visit #: 2/10 ([facility]) exp. 8/30/2024   HEP    -   Therapeutic Exercise   X 35 minutes  - seated R/L c-rotation 10x ea  - seated R/L c-lateral flexion 10x ea  - seated table slides with BUEs with towel 2 x 10  - standing R shoulder pendulums AP, ML, CW/CCW 25x ea  - supine R shoulder PROM in all planes x 23 minutes  - seated scap squeezes 2 x 10  - seated R bicep curls AROM 2 x 10  - yellow finger web 30x   X 35 minutes  - supine R shoulder PROM in all planes x 23 minutes  - supine R wrist flexion/extension 10x ea  - supine R yellow finger web 30x  - supine B shoulder flexion with wand 10x  - supine B chest press with wand 5x  - supine B shoulder ER AROM 10x  - supine R/L c-rotation 10x ea  - seated B table slides with towel 10x  - standing B shoulder extension 10x   Manual Therapy    - STM to R upper trap, R rhomboids, R mid trap, R teres m   x 8 minutes   Therapeutic Activity    -   Modalities   - heat pad to right shoulder at start of session in sitting x 5 minutes               Assessment: Session focused on continuation of shoulder stretching.        Plan: continue PT    Charges: Ex 2    Total Timed Treatment: 40 min  Total Treatment Time: 40 min

## 2024-07-11 ENCOUNTER — OFFICE VISIT (OUTPATIENT)
Dept: PHYSICAL THERAPY | Age: 79
End: 2024-07-11
Payer: MEDICAID

## 2024-07-11 PROCEDURE — 97110 THERAPEUTIC EXERCISES: CPT

## 2024-07-11 NOTE — PROGRESS NOTES
Diagnosis:  Orthopedic aftercare (Z47.89)  Traumatic closed fx of proximal humerus with minimal displacement, right, with delayed healing, subsequent encounter (S42.201G)             Next MD visit: none scheduled    Fall Risk: standard         Precautions: n/a          Medication Changes since last visit?: No    Subjective: Patient reports due to the rainy weather her arm is hurting more today.  Patient reports exercising her shoulder a lot daily.  Pt reports 6-7/10 pain today.       Objective:    7/11/2025:   Shoulder PROM (supine):  Shoulder flx: R 115 deg  Shoulder abd: R 100 deg  Shoulder ER: R 20 deg     Date: 7/11/2024  Visit #: 4/10 ([facility]) exp. 8/30/2024 Date: 7/9/2024  Visit #: 3/10 ([facility]) exp. 8/30/2024 Date: 7/3/2024  Visit #: 2/10 ([facility]) exp. 8/30/2024   HEP   - discussion of not overworking shoulder at home and giving proper rest time to tendons  -   Therapeutic Exercise   X 38 minutes  - seated R/L c-lateral flexion 10x ea  - supine B shoulder flexion with wand 2 x 10  - supine R shoulder PROM in all planes x 23 minutes  - supine B shoulder ER AROM 1 x 15  - supine R bicep curls AROM 2 x 10  - red finger web 30x  - standing B shoulder rolls 10x  - standing B shoulder flexion wall wash with towel 10x  - standing B shoulder extension 10x X 35 minutes  - seated R/L c-rotation 10x ea  - seated R/L c-lateral flexion 10x ea  - seated table slides with BUEs with towel 2 x 10  - standing R shoulder pendulums AP, ML, CW/CCW 25x ea  - supine R shoulder PROM in all planes x 23 minutes  - seated scap squeezes 2 x 10  - seated R bicep curls AROM 2 x 10  - yellow finger web 30x   X 35 minutes  - supine R shoulder PROM in all planes x 23 minutes  - supine R wrist flexion/extension 10x ea  - supine R yellow finger web 30x  - supine B shoulder flexion with wand 10x  - supine B chest press with wand 5x  - supine B shoulder ER AROM 10x  - supine R/L c-rotation 10x ea  - seated B table slides with  towel 10x  - standing B shoulder extension 10x   Manual Therapy     - STM to R upper trap, R rhomboids, R mid trap, R teres m   x 8 minutes   Therapeutic Activity     -   Modalities   - heat pad to right shoulder at start of session in sitting x 6 minutes - heat pad to right shoulder at start of session in sitting x 5 minutes                Assessment:   Patient demos improved shoulder PROM once again.  Initiated additional shoulder AROM exercises.        Plan: continue PT    Charges: Ex 3    Total Timed Treatment: 38 min  Total Treatment Time: 44 min

## 2024-07-16 ENCOUNTER — OFFICE VISIT (OUTPATIENT)
Dept: PHYSICAL THERAPY | Age: 79
End: 2024-07-16
Payer: MEDICAID

## 2024-07-16 ENCOUNTER — APPOINTMENT (OUTPATIENT)
Dept: GENERAL RADIOLOGY | Age: 79
End: 2024-07-16
Attending: PHYSICIAN ASSISTANT
Payer: MEDICAID

## 2024-07-16 ENCOUNTER — HOSPITAL ENCOUNTER (OUTPATIENT)
Age: 79
Discharge: HOME | End: 2024-07-16
Payer: MEDICAID

## 2024-07-16 VITALS
HEART RATE: 73 BPM | OXYGEN SATURATION: 99 % | RESPIRATION RATE: 19 BRPM | DIASTOLIC BLOOD PRESSURE: 74 MMHG | TEMPERATURE: 97 F | SYSTOLIC BLOOD PRESSURE: 126 MMHG

## 2024-07-16 DIAGNOSIS — M75.01 ADHESIVE CAPSULITIS OF RIGHT SHOULDER: ICD-10-CM

## 2024-07-16 DIAGNOSIS — M19.079 OSTEOARTHRITIS OF FIRST METATARSOPHALANGEAL JOINT: Primary | ICD-10-CM

## 2024-07-16 PROCEDURE — 73030 X-RAY EXAM OF SHOULDER: CPT | Performed by: PHYSICIAN ASSISTANT

## 2024-07-16 PROCEDURE — L3809 WHFO W/O JOINTS PRE OTS: HCPCS | Performed by: PHYSICIAN ASSISTANT

## 2024-07-16 PROCEDURE — 99213 OFFICE O/P EST LOW 20 MIN: CPT | Performed by: PHYSICIAN ASSISTANT

## 2024-07-16 PROCEDURE — 73130 X-RAY EXAM OF HAND: CPT | Performed by: PHYSICIAN ASSISTANT

## 2024-07-16 PROCEDURE — 97110 THERAPEUTIC EXERCISES: CPT

## 2024-07-16 RX ORDER — IBUPROFEN 600 MG/1
600 TABLET ORAL ONCE
Status: COMPLETED | OUTPATIENT
Start: 2024-07-16 | End: 2024-07-16

## 2024-07-16 NOTE — ED INITIAL ASSESSMENT (HPI)
Pt c/o pain to right upper arm started yesterday, sts that she has fx to affected area almost 2 months ago

## 2024-07-16 NOTE — DISCHARGE INSTRUCTIONS
TAKE IBUPROFEN 1ST LINE FOR PAIN/INFLAMMATION BY RECOMMENDATION. READDRESS WITH ORTHOPEDIC FOR PHYSICAL THERAPY ADJUSTMENT OR ALTERNATIVE INTERVENTIONS AT CLINICAL DISCRETION.     WEAR WRIST SPLINT OVER NEXT WEEK DAY/NIGHT BY RECOMMENDATION PENDING FOLLOW UP.

## 2024-07-16 NOTE — ED PROVIDER NOTES
Chief Complaint   Patient presents with    Pain       HPI:     Gioavnna Taylor is a 79 year old female who presents for evaluation of right shoulder pain worsening over the last few days with preceding proximal humeral fracture 2 months ago.  Currently in physical therapy without subsequentially injury or fall.  Notes has taken periodic ibuprofen Tylenol with limited over the last few days.  Patient also states previous hand injury with initial fall with ongoing discomfort and previous normal x-rays based on subjective history of patient and daughter independent history provided. denies associated chills headache dizziness neck pain chest pain shortness of breath abdominal pain arm swelling weakness or numbness      PFSH    PFSH asessment screens reviewed and agree.  Nurses notes reviewed I agree with documentation.    Family History   Problem Relation Age of Onset    No Known Problems Father     No Known Problems Mother     Diabetes Neg     Glaucoma Neg     Macular degeneration Neg      Family history reviewed with patient/caregiver and is not pertinent to presenting problem.  Social History     Socioeconomic History    Marital status:      Spouse name: Not on file    Number of children: Not on file    Years of education: Not on file    Highest education level: Not on file   Occupational History    Not on file   Tobacco Use    Smoking status: Never    Smokeless tobacco: Never   Substance and Sexual Activity    Alcohol use: No    Drug use: No    Sexual activity: Not on file   Other Topics Concern    Caffeine Concern Not Asked    Exercise Not Asked    Seat Belt Not Asked    Special Diet Not Asked    Stress Concern Not Asked    Weight Concern Not Asked   Social History Narrative    Not on file     Social Determinants of Health     Financial Resource Strain: Not on file   Food Insecurity: Not on file   Transportation Needs: Not on file   Physical Activity: Not on file   Stress: Not on file   Social Connections:  Not on file   Housing Stability: Not on file         ROS:   Positive for stated complaint: Shoulder, hand pain  All other systems reviewed and negative except as noted above.  Constitutional and Vital Signs Reviewed.      Physical Exam:     Findings:    /74   Pulse 73   Temp 96.9 °F (36.1 °C) (Temporal)   Resp 19   SpO2 99%   GENERAL: well developed, well nourished, well hydrated, no distress  SKIN: good skin turgor, no obvious rashes  NECK: No cervical or paracervical tenderness supple, no adenopathy  CARDIO: RRR without murmur  EXTREMITIES: Positive grimace with attempted abduction of the right shoulder above 45 degrees.  No AC tenderness, no edema along the deltoid or proximal arm.  Radial pulse and sensation intact.  Mild tenderness along the right hypothenar eminence without associated edema erythema or warmth.  No anatomical snuffbox tenderness. . ALANIS without difficulty  GI: No right upper quadrant tenderness, nondistended normal bowel sounds  HEAD: normocephalic, atraumatic  EYES: sclera non icteric bilateral, conjunctiva clear  NOSE: nasal turbinates: pink, normal mucosa  LUNGS: clear to auscultation bilaterally; no rales, rhonchi, or wheezes  NEURO: No focal deficits  PSYCH: Alert and oriented x3.  Answering questions appropriately.  Mood appropriate.    MDM/Assessment/Plan:   Orders for this encounter:    Orders Placed This Encounter    XR SHOULDER, COMPLETE (MIN 2 VIEWS), RIGHT (CPT=73030)     Order Specific Question:   What is the Relevant Clinical Indication / Reason for Exam?     Answer:   rt shoulder pain     Order Specific Question:   Release to patient     Answer:   Immediate    XR HAND (MIN 3 VIEWS), RIGHT (CPT=73130)     Order Specific Question:   What is the Relevant Clinical Indication / Reason for Exam?     Answer:   PAIN, FALL HX     Order Specific Question:   Release to patient     Answer:   Immediate    Wrist velcro with spica splint    ibuprofen (Motrin) tab 600 mg       Labs  performed this visit:  No results found for this or any previous visit (from the past 10 hour(s)).    MDM:  Patient with no acute changes, exam most concerning of adhesive capsulitis versus bursitis.  Agrees to readdress with orthopedic for further recommendations, hand x-ray without acute concerns with osteoarthritis noted along that first digit.  Thumb spica splint provided for support.  Daughter states patient has been offered local cortisone injections to the hand.  Agrees to readdress outpatient with orthopedics for cortisone considerations without oral corticosteroids to complicate or delay further treatment at this time.  Agrees with NSAID use first-line by recommendation happy with plan of care for    Diagnosis:    ICD-10-CM    1. Osteoarthritis of first metatarsophalangeal joint  M19.079       2. Adhesive capsulitis of right shoulder  M75.01           All results reviewed and discussed with patient.  See AVS for detailed discharge instructions for your condition today.    Follow Up with:  Ilya Mccormick MD  [address] [address]  [phone]    Schedule an appointment as soon as possible for a visit in 1 week  follow up by recommendation.

## 2024-07-16 NOTE — PROGRESS NOTES
Diagnosis:  Orthopedic aftercare (Z47.89)  Traumatic closed fx of proximal humerus with minimal displacement, right, with delayed healing, subsequent encounter (S42.201G)             Next MD visit: 7/22/2024    Fall Risk: standard         Precautions: n/a          Medication Changes since last visit?: No    Subjective: Patient reports her shoulder is hurting a lot today.  Patient reports 8/10 pain this morning and last night.  Her daughter states she almost wanted to go to the ER.   Daughter states patient moves her arm a lot throughout the day as she wants it to get better.      Objective:    7/11/2025:   Shoulder PROM (supine):  Shoulder flx: R 115 deg  Shoulder abd: R 100 deg  Shoulder ER: R 20 deg     Date: 7/16/2024  Visit #: 5/10 ([facility]) exp. 8/30/2024 Date: 7/11/2024  Visit #: 4/10 ([facility]) exp. 8/30/2024 Date: 7/9/2024  Visit #: 3/10 ([facility]) exp. 8/30/2024   HEP    - discussion of not overworking shoulder at home and giving proper rest time to tendons    Therapeutic Exercise   X 23 minutes  - seated R/L c-lateral flexion 10x ea  - seated R/L c-rotation 10x ea  - seated c-extension 10x  - supine R shoulder PROM in all planes x 3 minutes (painful today - discontinue)  - red finger web 20x (pain today - discontinue) X 38 minutes  - seated R/L c-lateral flexion 10x ea  - supine B shoulder flexion with wand 2 x 10  - supine R shoulder PROM in all planes x 23 minutes  - supine B shoulder ER AROM 1 x 15  - supine R bicep curls AROM 2 x 10  - red finger web 30x  - standing B shoulder rolls 10x  - standing B shoulder flexion wall wash with towel 10x  - standing B shoulder extension 10x X 35 minutes  - seated R/L c-rotation 10x ea  - seated R/L c-lateral flexion 10x ea  - seated table slides with BUEs with towel 2 x 10  - standing R shoulder pendulums AP, ML, CW/CCW 25x ea  - supine R shoulder PROM in all planes x 23 minutes  - seated scap squeezes 2 x 10  - seated R bicep curls AROM 2 x 10  -  yellow finger web 30x     Manual Therapy        Therapeutic Activity   - discussion of resting shoulder/arm today and tomorrow due to highly irritable pain levels; utilizes meds as prescribed by MD for pain relief  X 5 minutes     Modalities   - heat pad to right shoulder at start of session in sitting x 5 minutes - heat pad to right shoulder at start of session in sitting x 6 minutes - heat pad to right shoulder at start of session in sitting x 5 minutes               Assessment:   Patient with highly irritable pain levels today and unable to tolerate passive stretching, therapeutic exercises.  Discussed with pt to rest shoulder next few days and to not perform HEP repeatedly throughout day as rest days and specific frequency of stretches are needed for recovery as well.   Session duration shortened  due to limited activity tolerance.          Plan: continue PT    Charges: Ex 2     Total Timed Treatment: 23 min  Total Treatment Time: 33 min

## 2024-07-19 ENCOUNTER — OFFICE VISIT (OUTPATIENT)
Dept: PHYSICAL THERAPY | Age: 79
End: 2024-07-19
Attending: INTERNAL MEDICINE
Payer: MEDICAID

## 2024-07-19 PROCEDURE — 97014 ELECTRIC STIMULATION THERAPY: CPT

## 2024-07-19 PROCEDURE — 97110 THERAPEUTIC EXERCISES: CPT

## 2024-07-19 NOTE — PROGRESS NOTES
Diagnosis:  Orthopedic aftercare (Z47.89)  Traumatic closed fx of proximal humerus with minimal displacement, right, with delayed healing, subsequent encounter (S42.201G)             Next MD visit: 7/22/2024    Fall Risk: standard         Precautions: n/a          Medication Changes since last visit?: No    Subjective: Patient reports 4/10 shoulder pain this morning.  She states she went to the ER due to pain the other day and had an x-ray done and MD stated she may be developing a frozen shoulder.  Pt states she will see her MD next week.      Objective:    7/11/2025:   Shoulder PROM (supine):  Shoulder flx: R 115 deg  Shoulder abd: R 100 deg  Shoulder ER: R 20 deg    7/19/2024:  Shoulder PROM (supine):  Shoulder flx: R 115 deg  Shoulder abd: R 95 deg  Shoulder ER: R 23 deg     Date: 7/19/2024  Visit #: 6/10 ([facility]) exp. 8/30/2024 Date: 7/16/2024  Visit #: 5/10 ([facility]) exp. 8/30/2024 Date: 7/11/2024  Visit #: 4/10 ([facility]) exp. 8/30/2024   HEP   - updated HEP - see pt instructions section  - discussion of not overworking shoulder at home and giving proper rest time to tendons   Therapeutic Exercise   X 25 minutes  - supine R shoulder PROM stretching in all planes  - seated OH pulleys into flexion 25 reps  - standing B shoulder flexion wall wash with towel 10x   X 23 minutes  - seated R/L c-lateral flexion 10x ea  - seated R/L c-rotation 10x ea  - seated c-extension 10x  - supine R shoulder PROM in all planes x 3 minutes (painful today - discontinue)  - red finger web 20x (pain today - discontinue) X 38 minutes  - seated R/L c-lateral flexion 10x ea  - supine B shoulder flexion with wand 2 x 10  - supine R shoulder PROM in all planes x 23 minutes  - supine B shoulder ER AROM 1 x 15  - supine R bicep curls AROM 2 x 10  - red finger web 30x  - standing B shoulder rolls 10x  - standing B shoulder flexion wall wash with towel 10x  - standing B shoulder extension 10x   Manual Therapy   X 6 minutes  -  STM R upper trap, R biceps, R deltoid     Therapeutic Activity    - discussion of resting shoulder/arm today and tomorrow due to highly irritable pain levels; utilizes meds as prescribed by MD for pain relief  X 5 minutes    Modalities   - heat pad to right shoulder at start of session in sitting x 3 minutes  - estim/IFC to right shoulder in sitting x 15 minutes - heat pad to right shoulder at start of session in sitting x 5 minutes - heat pad to right shoulder at start of session in sitting x 6 minutes               Assessment:   Session focused on PROM shoulder stretching.  Initiated overhead pulleys this session.      Plan: continue PT    Charges: Ex 2  Estim 1   Total Timed Treatment: 31 min  Total Treatment Time: 49 min

## 2024-07-22 ENCOUNTER — OFFICE VISIT (OUTPATIENT)
Dept: PHYSICAL THERAPY | Age: 79
End: 2024-07-22
Payer: MEDICAID

## 2024-07-22 PROCEDURE — 97110 THERAPEUTIC EXERCISES: CPT

## 2024-07-22 PROCEDURE — 97140 MANUAL THERAPY 1/> REGIONS: CPT

## 2024-07-22 NOTE — PROGRESS NOTES
Diagnosis:  Orthopedic aftercare (Z47.89)  Traumatic closed fx of proximal humerus with minimal displacement, right, with delayed healing, subsequent encounter (S42.201G)             Next MD visit: 7/23/2024    Fall Risk: standard         Precautions: n/a          Medication Changes since last visit?: No    Subjective:  Patient reports she will see the MD tomorrow.  Did not rate pain today.    Objective:    7/11/2025:   Shoulder PROM (supine):  Shoulder flx: R 115 deg  Shoulder abd: R 100 deg  Shoulder ER: R 20 deg    7/19/2024:  Shoulder PROM (supine):  Shoulder flx: R 115 deg  Shoulder abd: R 95 deg  Shoulder ER: R 23 deg    7/22/2024:  Shoulder PROM (supine):  Shoulder flx: R 120 deg  Shoulder abd: 100 deg  Shoulder ER: R 25 deg       Date: 7/22/2024  Visit #: 7/10 ([facility]) exp. 8/30/2024 Date: 7/19/2024  Visit #: 6/10 ([facility]) exp. 8/30/2024 Date: 7/16/2024  Visit #: 5/10 ([facility]) exp. 8/30/2024   HEP    - updated HEP - see pt instructions section    Therapeutic Exercise   X 36 minutes  - NuStep UEs and LEs x 5 minutes (level 2)  - supine R shoulder PROM stretching in all planes  - supine B shoulder flexion with theracane 2 x 5 reps   - seated OH pulleys into flexion 1 minute x 2 reps  - standing R railing slide 3 x 10  - seated B shoulder shrugs 10x  - seated B scap squeezes 10x  - seated B shoulder AAROM shoulder flexion 5 reps  - seated B shoulder ER AROM 10x  - standing B shoulder extension AROM 2 x 10 X 25 minutes  - supine R shoulder PROM stretching in all planes  - seated OH pulleys into flexion 25 reps  - standing B shoulder flexion wall wash with towel 10x   X 23 minutes  - seated R/L c-lateral flexion 10x ea  - seated R/L c-rotation 10x ea  - seated c-extension 10x  - supine R shoulder PROM in all planes x 3 minutes (painful today - discontinue)  - red finger web 20x (pain today - discontinue)   Manual Therapy   X 15 minutes  - STM R upper trap, R biceps, R deltoid, teres major, R GH  joint distraction & compression grade 2 X 6 minutes  - STM R upper trap, R biceps, R deltoid    Therapeutic Activity       - discussion of resting shoulder/arm today and tomorrow due to highly irritable pain levels; utilizes meds as prescribed by MD for pain relief  X 5 minutes   Modalities    - heat pad to right shoulder at start of session in sitting x 3 minutes  - estim/IFC to right shoulder in sitting x 15 minutes - heat pad to right shoulder at start of session in sitting x 5 minutes               Assessment:   Patient displaying improved R shoulder PROM this session.  Continued with PROM/AAROM/AROM stretching and manual techniques.  Pt reporting relief with GH joint compression oscillations.       Plan: continue PT    Charges: Ex 2 Man 1  Total Timed Treatment: 51 min  Total Treatment Time: 51 min

## 2024-07-23 ENCOUNTER — OFFICE VISIT (OUTPATIENT)
Dept: ORTHOPEDICS CLINIC | Facility: CLINIC | Age: 79
End: 2024-07-23
Payer: MEDICAID

## 2024-07-23 ENCOUNTER — HOSPITAL ENCOUNTER (OUTPATIENT)
Dept: GENERAL RADIOLOGY | Facility: HOSPITAL | Age: 79
Discharge: HOME | End: 2024-07-23
Attending: ORTHOPAEDIC SURGERY
Payer: MEDICAID

## 2024-07-23 DIAGNOSIS — T14.8XXA FRACTURE: Primary | ICD-10-CM

## 2024-07-23 DIAGNOSIS — S49.91XA RIGHT SHOULDER INJURY, INITIAL ENCOUNTER: ICD-10-CM

## 2024-07-23 DIAGNOSIS — M80.021G AGE-RELATED OSTEOPOROSIS WITH CURRENT PATHOLOGICAL FRACTURE OF RIGHT HUMERUS WITH DELAYED HEALING: ICD-10-CM

## 2024-07-23 DIAGNOSIS — S42.201G: ICD-10-CM

## 2024-07-23 DIAGNOSIS — T14.8XXA FRACTURE: ICD-10-CM

## 2024-07-23 PROCEDURE — 99024 POSTOP FOLLOW-UP VISIT: CPT | Performed by: ORTHOPAEDIC SURGERY

## 2024-07-23 PROCEDURE — 73030 X-RAY EXAM OF SHOULDER: CPT | Performed by: ORTHOPAEDIC SURGERY

## 2024-07-23 NOTE — PROGRESS NOTES
NURSING INTAKE COMMENTS:   Chief Complaint   Patient presents with    Fracture     F/u right shoulder Fracture. Intermittent pain 6-9. Visit to  on 07/16/24 due to pain. with XR.  Taking Motrin and Tylenol to manage pain, at times it relieves pain. Daughter is present at this visit.       HPI: This 79 year old female presents today with her daughter who translated.  She is about 2.5 months after right proximal humerus fracture.  She goes to therapy but it is not clear that she is pushing herself much at home.  Her daughter says that she does do the exercises but she does not really notice her pushing herself.  She has not made much gains in terms of motion since her last visit.  She also has some right wrist pain which could be carpal tunnel but I wanted to concentrate on the shoulder today.    Past Medical History:    Arthritis    Atrophic gastritis    Diverticulosis large intestine w/o perforation or abscess w/o bleeding    Diverticulosis of large intestine    Esophageal reflux    Gastropathy    High cholesterol    Internal hemorrhoids without complication    Visual impairment     Past Surgical History:   Procedure Laterality Date    Cholecystectomy      Colonoscopy N/A 6/20/2018    Procedure: COLONOSCOPY;  Surgeon: Linda Espinal MD;  Location: [facility] ENDO    Colonoscopy      Upper gi endoscopy,exam       Current Outpatient Medications   Medication Sig Dispense Refill    Calcium 600-5 MG-MCG Oral Tab Take 1 tablet by mouth 2 (two) times daily with meals. 60 tablet 0    Multiple Vitamins-Minerals (CEROVITE SENIOR) Oral Tab Take 1 tablet by mouth daily. 60 tablet 0    clotrimazole 1 % External Cream Topically bid 40 g 3    donepezil 10 MG Oral Tab Take 1 tablet (10 mg total) by mouth nightly. 90 tablet 3    ergocalciferol 1.25 MG (74645 UT) Oral Cap Take 1 capsule (50,000 Units total) by mouth once a week. 12 capsule 3    simvastatin 20 MG Oral Tab Take 1 tablet (20 mg total) by mouth nightly. 90  tablet 3    triamcinolone acetonide 0.1 % External Cream Apply topically 2 (two) times daily as needed. 15 g 1    acetaminophen 325 MG Oral Tab Take 1 tablet (325 mg total) by mouth every 6 (six) hours as needed for Pain.      aspirin 81 MG Oral Tab Take 1 tablet (81 mg total) by mouth daily. (Patient not taking: Reported on 7/23/2024)       No Known Allergies  Family History   Problem Relation Age of Onset    No Known Problems Father     No Known Problems Mother     Diabetes Neg     Glaucoma Neg     Macular degeneration Neg      No family Hx of DVT/PE    Social History     Occupational History    Not on file   Tobacco Use    Smoking status: Never    Smokeless tobacco: Never   Substance and Sexual Activity    Alcohol use: No    Drug use: No    Sexual activity: Not on file        Review of Systems:  GENERAL: feels generally well, no significant weight loss or weight gain  SKIN: no ulcerated or worrisome skin lesions  EYES:denies blurred vision or double vision  HEENT: denies new nasal congestion, sinus pain or ST  LUNGS: denies shortness of breath  CARDIOVASCULAR: denies chest pain  GI: no hematemesis, no worsening heartburn, no diarrhea  : no dysuria, no blood in urine, no difficulty urinating, no incontinence  MUSCULOSKELETAL: no other musculoskeletal complaints other than in HPI  NEURO: no numbness or tingling, no weakness or balance disorder  PSYCHE: no depression or anxiety  HEMATOLOGIC: no hx of blood dyscrasia, no Hx DVT/PE  ENDOCRINE: no thyroid or diabetes issues  ALL/ASTHMA: no new hx of severe allergy or asthma    Physical Examination:    There were no vitals taken for this visit.  Constitutional: appears well hydrated, alert and responsive, no acute distress noted  Extremities: Contours of the right shoulder were symmetrically normal to the left.  No masses or deformities.  Musculoskeletal: On the wall she was able to get up to 125 degrees.  I showed her how to push it.  Internal rotation about the  thumbs to the PSIS.  External rotation was 50 degrees.  Neurological: Normal motor and sensory right hand and fingers.    Imaging: X-rays of the right shoulder today show good callus and anatomic alignment of the right proximal humerus fracture.  No dislocation or high riding humerus.      XR HAND (MIN 3 VIEWS), RIGHT (CPT=73130)    Result Date: 7/16/2024  PROCEDURE: XR HAND (MIN 3 VIEWS), RIGHT (CPT=73130)  COMPARISON: [facility], XR HAND (MIN 3 VIEWS), RIGHT (CPT=73130), 10/09/2023, 1:38 PM.  [facility], XR HAND CLINIC (2 VIEWS) BILAT (CPT=73120), 3/24/2017, 2:54 PM.  INDICATIONS: Right hand, 1st metacarpal pain x1 week. No injury.  TECHNIQUE: 3 views were obtained.   FINDINGS:  BONES: There is diffuse osseous demineralization, which limits sensitivity for detection of osseous pathology. no acute fracture or osseous malalignment.  There is scattered interphalangeal joint narrowing with osteophyte formation.  There is first metacarpophalangeal joint sclerosis with osteophyte formation. SOFT TISSUES: Negative. No visible soft tissue swelling. EFFUSION: None visible. OTHER: Negative.         CONCLUSION:   No acute fracture or dislocation.  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  Demineralization.    Dictated by (CST): Ese Barnes MD on 7/16/2024 at 10:08 AM     Finalized by (CST): Ese Barnes MD on 7/16/2024 at 10:09 AM          XR SHOULDER, COMPLETE (MIN 2 VIEWS), RIGHT (CPT=73030)    Result Date: 7/16/2024  PROCEDURE: XR SHOULDER, COMPLETE (MIN 2 VIEWS), RIGHT (CPT=73030)  COMPARISON: [facility] 2nd Floor, XR SHOULDER, COMPLETE (MIN 2 VIEWS), RIGHT (CPT=73030), 7/01/2024, 11:24 AM.  [address], XR SHOULDER, COMPLETE (MIN 2 VIEWS), RIGHT (CPT=73030), 6/04/2024, 9:48 AM.  [address], XR SHOULDER,  COMPLETE (MIN 2 VIEWS), RIGHT (CPT=73030), 5/21/2024, 5:24 PM.  INDICATIONS: Right generalized shoulder pain worsening x1 day. History of right humerus fracture x2 months ago. No new injury.  TECHNIQUE: 3. views were obtained.   FINDINGS:  BONES:  There is diffuse osseous demineralization, which limits sensitivity for detection of osseous pathology  A healing subacute comminuted , angulated, impacted fracture of the surgical neck of the humerus is apparent.  Mild periosteal reaction is seen along the fracture margins, but the fracture line remains visible. The acromioclavicular and glenohumeral joints are congruent; mild degeneration is present. No suspicious osseous lesions are detected. There is partially imaged multilevel degenerative disease of the spine. SOFT TISSUES: Negative for visible soft tissue swelling or radiopaque foreign body.  EFFUSION: None visible. OTHER: Visualized portions of the ipsilateral hemithorax are grossly unremarkable.         CONCLUSION:   Healing comminuted, impacted fracture of the surgical neck the humerus.  Mild acromioclavicular and glenohumeral joint osteoarthritis again noted.  Demineralization.    Dictated by (CST): Ese Barnes MD on 7/16/2024 at 10:04 AM     Finalized by (CST): Ese Barnes MD on 7/16/2024 at 10:06 AM          XR SHOULDER, COMPLETE (MIN 2 VIEWS), RIGHT (CPT=73030)    Result Date: 7/1/2024  PROCEDURE: XR SHOULDER, COMPLETE (MIN 2 VIEWS), RIGHT (CPT=73030)  COMPARISON: [facility] 2nd Floor, XR SHOULDER, COMPLETE (MIN 2 VIEWS), RIGHT (CPT=73030), 6/04/2024, 9:48 AM.  [facility] 2nd Floor, XR SHOULDER, COMPLETE (MIN 2 VIEWS), RIGHT (CPT=73030), 5/21/2024, 5:24  PM.  [facility] 2nd Floor, XR SHOULDER, (1 VIEW), RIGHT (CPT=73020), 5/06/2024, 3:48 PM.  [facility], XR SHOULDER, COMPLETE (MIN 2 VIEWS), RIGHT (CPT=73030), 5/03/2024, 12:58 PM.  INDICATIONS: Right shoulder  fracture; follow-up.  TECHNIQUE: 2 views were obtained.   FINDINGS:  BONES: A healing subacute comminuted impacted fracture of the surgical neck of the humerus is apparent. The degree of impaction may be slightly worsened in the interim. Mild periosteal reaction is seen along the fracture margins, but the fracture line remains visible. The acromioclavicular and glenohumeral joints are congruent; mild degeneration is present. No suspicious osseous lesions are detected. SOFT TISSUES: Negative for visible soft tissue swelling or radiopaque foreign body.  EFFUSION: Caudal displacement of humeral has improved.  OTHER: Visualized portions of the ipsilateral hemithorax are grossly unremarkable.          CONCLUSION:  1. Healing subacute comminuted fracture of the proximal right humerus with possible slight worsening of impaction.  2. Right shoulder joint effusion appears decreased from previous.  3. Primary osteoarthritic changes of the right shoulder are evident.    Dictated by (CST): Yahir Harvey MD on 7/01/2024 at 4:49 PM     Finalized by (CST): Yahir Harvey MD on 7/01/2024 at 4:50 PM          XR WRIST COMPLETE (MIN 3 VIEWS), RIGHT (CPT=73110)    Result Date: 7/1/2024  PROCEDURE: XR WRIST COMPLETE (MIN 3 VIEWS), RIGHT (CPT=73110)  COMPARISON: [facility], XR WRIST COMPLETE (MIN 3 VIEWS), RIGHT (CPT=73110), 10/09/2023, 1:32 PM.  INDICATIONS: right wrist pain post fall  TECHNIQUE: 3 views were obtained.   FINDINGS:  BONES: No acute fracture or dislocation.  Mild degenerative change noted. SOFT TISSUES: Negative. No visible soft tissue swelling. EFFUSION: None visible. OTHER: Negative.         CONCLUSION:   No acute fracture or dislocation.    Dictated by (CST): Eduardo Galeana MD on 7/01/2024 at 3:14 PM     Finalized by (CST): Eduardo Galeana MD on 7/01/2024 at 3:16 PM             Lab Results   Component Value Date    WBC 4.4 12/23/2023    HGB 12.6 12/23/2023    .0 12/23/2023      Lab Results    Component Value Date    GLU 89 12/23/2023    BUN 14 12/23/2023    CREATSERUM 0.83 12/23/2023    GFRNAA 74 08/13/2021    GFRAA 85 08/13/2021        Assessment and Plan:  Diagnoses and all orders for this visit:    Fracture  -     XR SHOULDER, COMPLETE (MIN 2 VIEWS), RIGHT (CPT=73030); Future  -     PHYSICAL THERAPY - INTERNAL    Traumatic closed fx of proximal humerus with minimal displacement, right, with delayed healing, subsequent encounter  -     PHYSICAL THERAPY - INTERNAL    Right shoulder injury, initial encounter  -     PHYSICAL THERAPY - INTERNAL    Age-related osteoporosis with current pathological fracture of right humerus with delayed healing  -     PHYSICAL THERAPY - INTERNAL        Assessment: 2.5 months after right proximal humerus fracture healing in good alignment but patient needs to push herself in terms of range of motion.    Plan: Again we spent most of the visit talk about pushing herself with range of motion.  She will continue therapy but more importantly the home exercise program for several hours a day.  I will see her in 4 weeks.  She will continue the calcium and vitamin supplementation for the osteoporosis portion of her injury.  She should have an x-ray of the right shoulder at the next visit.    Follow Up: No follow-ups on file.    Ilya Mccormick MD

## 2024-07-25 ENCOUNTER — OFFICE VISIT (OUTPATIENT)
Dept: PHYSICAL THERAPY | Age: 79
End: 2024-07-25
Attending: INTERNAL MEDICINE
Payer: MEDICAID

## 2024-07-25 PROCEDURE — 97110 THERAPEUTIC EXERCISES: CPT

## 2024-07-25 NOTE — PROGRESS NOTES
Diagnosis:  Orthopedic aftercare (Z47.89)  Traumatic closed fx of proximal humerus with minimal displacement, right, with delayed healing, subsequent encounter (S42.201G)             Next MD visit: unknown    Fall Risk: standard         Precautions: n/a          Medication Changes since last visit?: No    Subjective:  Patient reports 4-5/10 shoulder pain today.  She reports she saw the MD who gave her and updated script for aggressive ROM stretching.      Objective:    7/19/2024:  Shoulder PROM (supine):  Shoulder flx: R 115 deg  Shoulder abd: R 95 deg  Shoulder ER: R 23 deg    7/22/2024:  Shoulder PROM (supine):  Shoulder flx: R 120 deg  Shoulder abd: R 100 deg  Shoulder ER: R 25 deg    7/25/2024:  Shoulder PROM (supine):  Shoulder flx: R 130 deg  Shoulder abd: R 120 deg  Shoulder ER: R 40 deg     Date: 7/25/2024  Visit #: 8/10 ([facility]) exp. 8/30/2024 Date: 7/22/2024  Visit #: 7/10 ([facility]) exp. 8/30/2024 Date: 7/19/2024  Visit #: 6/10 ([facility]) exp. 8/30/2024   HEP     - updated HEP - see pt instructions section   Therapeutic Exercise   X 40 minutes  - NuStep UEs and LEs x 5 minutes (level 2)  - supine R shoulder PROM stretching in all planes  - supine B shoulder flexion with wand 3 x 10  - seated OH pulleys into flexion, abduction x 6 minutes (3 min ea)  - standing R railing slide 3 x 10 X 36 minutes  - NuStep UEs and LEs x 5 minutes (level 2)  - supine R shoulder PROM stretching in all planes  - supine B shoulder flexion with theracane 2 x 5 reps   - seated OH pulleys into flexion 1 minute x 2 reps  - standing R railing slide 3 x 10  - seated B shoulder shrugs 10x  - seated B scap squeezes 10x  - seated B shoulder AAROM shoulder flexion 5 reps  - seated B shoulder ER AROM 10x  - standing B shoulder extension AROM 2 x 10 X 25 minutes  - supine R shoulder PROM stretching in all planes  - seated OH pulleys into flexion 25 reps  - standing B shoulder flexion wall wash with towel 10x     Manual  Reference ECG taken Therapy   X 6 minutes  - GH joint mobs: compression, inferior, oscillations, A/P X 15 minutes  - STM R upper trap, R biceps, R deltoid, teres major, R GH joint distraction & compression grade 2 X 6 minutes  - STM R upper trap, R biceps, R deltoid   Therapeutic Activity          Modalities     - heat pad to right shoulder at start of session in sitting x 3 minutes  - estim/IFC to right shoulder in sitting x 15 minutes               Assessment:  Focus on aggressive stretching of R shoulder.  Initiated additional joint mobilizations this session.          Plan: continue PT    Charges: Ex 3 Total Timed Treatment: 46 min  Total Treatment Time: 46 min

## 2024-07-31 ENCOUNTER — APPOINTMENT (OUTPATIENT)
Dept: PHYSICAL THERAPY | Age: 79
End: 2024-07-31
Payer: MEDICAID

## 2024-08-02 ENCOUNTER — OFFICE VISIT (OUTPATIENT)
Dept: PHYSICAL THERAPY | Age: 79
End: 2024-08-02
Attending: INTERNAL MEDICINE
Payer: MEDICAID

## 2024-08-02 PROCEDURE — 97110 THERAPEUTIC EXERCISES: CPT

## 2024-08-02 NOTE — PROGRESS NOTES
Diagnosis:  Orthopedic aftercare (Z47.89)  Traumatic closed fx of proximal humerus with minimal displacement, right, with delayed healing, subsequent encounter (S42.201G)             Next MD visit: unknown    Fall Risk: standard         Precautions: n/a          Medication Changes since last visit?: No    Subjective:  Patient reports 10/10 last night due to the weather.  Pt reports 6-7/10 pain.      Objective:    8/2/2024:  Shoulder PROM (supine):  Shoulder flx: R 145 deg  Shoulder abd: R 145 deg  Shoulder ER: R 40 deg     Date: 8/2/2024  Visit #: 9/10 ([facility]) exp. 8/30/2024 Date: 7/25/2024  Visit #: 8/10 ([facility]) exp. 8/30/2024 Date: 7/22/2024  Visit #: 7/10 ([facility]) exp. 8/30/2024   HEP   - updated HEP - see pt instructions section     Therapeutic Exercise   X 42 minutes  - NuStep level 3 (UEs and LEs) x 5 minutes  - supine R shoulder PROM stretching in all planes  - supine B shoulder flexion with theracane 3 x 10  - supine B shoulder chest press 1# 2 x 10  - supine B shoulder ER 1# 2 x 10  - seated OH pulleys into flexion x 3 min  - supine L shoulder ER with cane 10x 5 sec holds  - standing YSB up wall 1 x 10  - standing B shoulder extension with wand 2 x 10  - standing B shoulder IR/HBB with wand 2 x 10 X 40 minutes  - NuStep UEs and LEs x 5 minutes (level 2)  - supine R shoulder PROM stretching in all planes  - supine B shoulder flexion with wand 3 x 10  - seated OH pulleys into flexion, abduction x 6 minutes (3 min ea)  - standing R railing slide 3 x 10 X 36 minutes  - NuStep UEs and LEs x 5 minutes (level 2)  - supine R shoulder PROM stretching in all planes  - supine B shoulder flexion with theracane 2 x 5 reps   - seated OH pulleys into flexion 1 minute x 2 reps  - standing R railing slide 3 x 10  - seated B shoulder shrugs 10x  - seated B scap squeezes 10x  - seated B shoulder AAROM shoulder flexion 5 reps  - seated B shoulder ER AROM 10x  - standing B shoulder extension AROM 2 x 10    Manual Therapy   X 3 minutes  - GH joint mobs: compression, oscillations X 6 minutes  - GH joint mobs: compression, inferior, oscillations, A/P X 15 minutes  - STM R upper trap, R biceps, R deltoid, teres major, R GH joint distraction & compression grade 2   Therapeutic Activity          Modalities                    Assessment:  Patient continues to display improved R shoulder ROM.  Initiated additional shoulder stretching and updated HEP.         Plan: continue PT - introduce theraband exercises next session    Charges: Ex 3 Total Timed Treatment: 45 min  Total Treatment Time: 45 min

## 2024-08-07 ENCOUNTER — OFFICE VISIT (OUTPATIENT)
Dept: PHYSICAL THERAPY | Age: 79
End: 2024-08-07
Payer: MEDICAID

## 2024-08-07 PROCEDURE — 97110 THERAPEUTIC EXERCISES: CPT

## 2024-08-07 NOTE — PROGRESS NOTES
Diagnosis:  Orthopedic aftercare (Z47.89)  Traumatic closed fx of proximal humerus with minimal displacement, right, with delayed healing, subsequent encounter (S42.201G)             Next MD visit: unknown    Fall Risk: standard         Precautions: n/a          Medication Changes since last visit?: No    Subjective:  Patient reports 4-5/10 shoulder pain today.      Objective:    8/7/2024:  Shoulder PROM (supine):  Shoulder flx: R 150 deg  Shoulder abd: R 150 deg  Shoulder ER: R 40 deg     Date: 8/7/2024  Visit #: 10/10 ([facility]) exp. 8/30/2024 Date: 8/2/2024  Visit #: 9/10 ([facility]) exp. 8/30/2024 Date: 7/25/2024  Visit #: 8/10 ([facility]) exp. 8/30/2024   HEP   - updated HEP - see pt instructions section - updated HEP - see pt instructions section    Therapeutic Exercise   X 42 minutes  - NuStep level 5 (UEs and LEs) x 5 minutes  - supine R shoulder PROM stretching in all planes  - supine B shoulder flexion with theracane 3 x 10  - supine B shoulder chest press 1# bar 1 x 10  - supine B chest press 1# 2 x 10  - supine L shoulder ER with cane 10x 5 sec holds  - standing B shoulder extension with wand 3 x 10  - standing B shoulder flexion with wand 2 x 10  - seated OH pulleys into flexion x 3 min X 42 minutes  - NuStep level 3 (UEs and LEs) x 5 minutes  - supine R shoulder PROM stretching in all planes  - supine B shoulder flexion with theracane 3 x 10  - supine B shoulder chest press 1# 2 x 10  - supine B shoulder ER 1# 2 x 10  - seated OH pulleys into flexion x 3 min  - supine L shoulder ER with cane 10x 5 sec holds  - standing YSB up wall 1 x 10  - standing B shoulder extension with wand 2 x 10  - standing B shoulder IR/HBB with wand 2 x 10 X 40 minutes  - NuStep UEs and LEs x 5 minutes (level 2)  - supine R shoulder PROM stretching in all planes  - supine B shoulder flexion with wand 3 x 10  - seated OH pulleys into flexion, abduction x 6 minutes (3 min ea)  - standing R railing slide 3 x 10    Manual Therapy   X 3 minutes  - GH joint mobs: compression, oscillations X 3 minutes  - GH joint mobs: compression, oscillations X 6 minutes  - GH joint mobs: compression, inferior, oscillations, A/P   Therapeutic Activity        Modalities                    Assessment:  Initiated theraband UE and scapular strengthening interventions this session.         Plan: continue PT     Charges: Ex 3 Total Timed Treatment: 45 min  Total Treatment Time: 45 min

## 2024-08-09 ENCOUNTER — OFFICE VISIT (OUTPATIENT)
Dept: PHYSICAL THERAPY | Age: 79
End: 2024-08-09
Attending: INTERNAL MEDICINE
Payer: MEDICAID

## 2024-08-09 PROCEDURE — 97110 THERAPEUTIC EXERCISES: CPT

## 2024-08-09 NOTE — PROGRESS NOTES
Diagnosis:  Orthopedic aftercare (Z47.89)  Traumatic closed fx of proximal humerus with minimal displacement, right, with delayed healing, subsequent encounter (S42.201G)             Next MD visit: unknown    Fall Risk: standard         Precautions: n/a          Medication Changes since last visit?: No    Subjective:  Patient reports 5-6/10 shoulder pain.     Objective:    8/7/2024:  Shoulder PROM (supine):  Shoulder flx: R 150 deg  Shoulder abd: R 150 deg  Shoulder ER: R 40 deg     Date: 8/9/2024  Visit #: 11/12 ([facility]) exp. 8/30/2024 Date: 8/7/2024  Visit #: 10/10 ([facility]) exp. 8/30/2024 Date: 8/2/2024  Visit #: 9/10 ([facility]) exp. 8/30/2024   HEP    - updated HEP - see pt instructions section - updated HEP - see pt instructions section   Therapeutic Exercise   X 43 minutes  - NuStep level 5 (UEs and LEs) x 5 minutes  - supine R shoulder PROM stretching in all planes  - supine B shoulder horizontal abd/add 0# 2 x 10  - supine B shoulder ER 2 x 10  - supine B shoulder chest press 1# bar 1 x 10  - supine L shoulder flexion 1# 10x  - supine B chest press 2# 2 x 10  - sidelying L shoulder ER 2 x 10  - standing B scap rows with GSC 3 x 10  - standing B shoulder extension with RSC 2 x 10  - seated OH pulleys into flexion x 3 min X 42 minutes  - NuStep level 5 (UEs and LEs) x 5 minutes  - supine R shoulder PROM stretching in all planes  - supine B shoulder flexion with theracane 3 x 10  - supine B shoulder chest press 1# bar 1 x 10  - supine B chest press 1# 2 x 10  - supine L shoulder ER with cane 10x 5 sec holds  - standing B shoulder extension with wand 3 x 10  - standing B shoulder flexion with wand 2 x 10  - seated OH pulleys into flexion x 3 min X 42 minutes  - NuStep level 3 (UEs and LEs) x 5 minutes  - supine R shoulder PROM stretching in all planes  - supine B shoulder flexion with theracane 3 x 10  - supine B shoulder chest press 1# 2 x 10  - supine B shoulder ER 1# 2 x 10  - seated OH pulleys  into flexion x 3 min  - supine L shoulder ER with cane 10x 5 sec holds  - standing YSB up wall 1 x 10  - standing B shoulder extension with wand 2 x 10  - standing B shoulder IR/HBB with wand 2 x 10   Manual Therapy    X 3 minutes  - GH joint mobs: compression, oscillations X 3 minutes  - GH joint mobs: compression, oscillations   Therapeutic Activity        Modalities                    Assessment: Progressed global UE strengthening this session.        Plan: continue PT - as needed; pt to travel out of town next week     Charges: Ex 3 Total Timed Treatment: 43 min  Total Treatment Time: 43 min

## 2024-08-12 ENCOUNTER — APPOINTMENT (OUTPATIENT)
Dept: PHYSICAL THERAPY | Age: 79
End: 2024-08-12
Payer: MEDICAID

## 2024-08-14 ENCOUNTER — APPOINTMENT (OUTPATIENT)
Dept: PHYSICAL THERAPY | Age: 79
End: 2024-08-14
Payer: MEDICAID

## 2024-11-18 ENCOUNTER — OFFICE VISIT (OUTPATIENT)
Dept: INTERNAL MEDICINE CLINIC | Facility: CLINIC | Age: 79
End: 2024-11-18
Payer: MEDICAID

## 2024-11-18 VITALS
OXYGEN SATURATION: 96 % | DIASTOLIC BLOOD PRESSURE: 60 MMHG | HEART RATE: 73 BPM | SYSTOLIC BLOOD PRESSURE: 110 MMHG | WEIGHT: 158 LBS | BODY MASS INDEX: 26.33 KG/M2 | HEIGHT: 65 IN

## 2024-11-18 DIAGNOSIS — F01.50 VASCULAR DEMENTIA WITHOUT BEHAVIORAL DISTURBANCE (HCC): ICD-10-CM

## 2024-11-18 DIAGNOSIS — E55.9 VITAMIN D DEFICIENCY: ICD-10-CM

## 2024-11-18 DIAGNOSIS — Z00.00 WELLNESS EXAMINATION: Primary | ICD-10-CM

## 2024-11-18 DIAGNOSIS — E78.2 MIXED HYPERLIPIDEMIA: ICD-10-CM

## 2024-11-18 PROCEDURE — 99397 PER PM REEVAL EST PAT 65+ YR: CPT | Performed by: INTERNAL MEDICINE

## 2024-11-18 RX ORDER — SIMVASTATIN 20 MG
20 TABLET ORAL NIGHTLY
Qty: 90 TABLET | Refills: 3 | Status: SHIPPED | OUTPATIENT
Start: 2024-11-18

## 2024-11-18 RX ORDER — ERGOCALCIFEROL 1.25 MG/1
50000 CAPSULE, LIQUID FILLED ORAL WEEKLY
Qty: 12 CAPSULE | Refills: 3 | Status: SHIPPED | OUTPATIENT
Start: 2024-11-18

## 2024-11-18 RX ORDER — DONEPEZIL HYDROCHLORIDE 10 MG/1
10 TABLET, FILM COATED ORAL NIGHTLY
Qty: 90 TABLET | Refills: 3 | Status: SHIPPED | OUTPATIENT
Start: 2024-11-18

## 2024-11-18 NOTE — PROGRESS NOTES
Subjective:   Giovanna Taylor is a 79 year old female who presents for Physical     Patient here for a wellness visit and fu on vascular dementia and vit d deficiency and hyperlipidemia. Had right arm fracture from a fall earlier in the year. Memory loss has stabilized.  No active complaints.  History/Other:    Chief Complaint Reviewed and Verified  No Further Nursing Notes to   Review  Medications Reviewed  Problem List Reviewed         Tobacco:  Non smoker    Current Outpatient Medications   Medication Sig Dispense Refill    Calcium 600-5 MG-MCG Oral Tab Take 1 tablet by mouth 2 (two) times daily with meals. 60 tablet 0    Multiple Vitamins-Minerals (CEROVITE SENIOR) Oral Tab Take 1 tablet by mouth daily. 60 tablet 0    clotrimazole 1 % External Cream Topically bid 40 g 3    donepezil 10 MG Oral Tab Take 1 tablet (10 mg total) by mouth nightly. 90 tablet 3    ergocalciferol 1.25 MG (97368 UT) Oral Cap Take 1 capsule (50,000 Units total) by mouth once a week. 12 capsule 3    simvastatin 20 MG Oral Tab Take 1 tablet (20 mg total) by mouth nightly. 90 tablet 3    aspirin 81 MG Oral Tab Take 1 tablet (81 mg total) by mouth daily. (Patient not taking: Reported on 7/23/2024)      triamcinolone acetonide 0.1 % External Cream Apply topically 2 (two) times daily as needed. 15 g 1    acetaminophen 325 MG Oral Tab Take 1 tablet (325 mg total) by mouth every 6 (six) hours as needed for Pain.           Review of Systems:  Review of Systems   Constitutional:  Negative for fatigue and unexpected weight change.   HENT:  Positive for hearing loss.    Respiratory:  Negative for shortness of breath.    Cardiovascular:  Negative for chest pain, palpitations and leg swelling.   Gastrointestinal:  Positive for constipation.   Endocrine: Negative.    Genitourinary: Negative.    Musculoskeletal:  Negative for arthralgias.   Neurological: Negative.    Psychiatric/Behavioral:  Positive for decreased concentration.          Objective:    /60   Pulse 73   Ht 5' 5\" (1.651 m)   Wt 158 lb (71.7 kg)   SpO2 96%   BMI 26.29 kg/m²  Estimated body mass index is 26.29 kg/m² as calculated from the following:    Height as of this encounter: 5' 5\" (1.651 m).    Weight as of this encounter: 158 lb (71.7 kg).  Physical Exam  Constitutional:       Appearance: She is normal weight.   HENT:      Head: Normocephalic and atraumatic.      Right Ear: Ear canal normal.      Left Ear: Ear canal normal.      Ears:      Comments: Very hard of hearing refusing aids  Eyes:      General: No scleral icterus.     Pupils: Pupils are equal, round, and reactive to light.   Neck:      Vascular: No carotid bruit.   Cardiovascular:      Rate and Rhythm: Normal rate and regular rhythm.      Heart sounds: No murmur heard.     No gallop.   Pulmonary:      Effort: Pulmonary effort is normal.      Breath sounds: Normal breath sounds.   Abdominal:      Palpations: Abdomen is soft.      Tenderness: There is no abdominal tenderness.   Musculoskeletal:      Cervical back: Neck supple.      Right lower leg: No edema.      Left lower leg: No edema.   Skin:     Findings: No lesion.   Neurological:      General: No focal deficit present.      Mental Status: She is alert and oriented to person, place, and time.   Psychiatric:         Thought Content: Thought content normal.           Assessment & Plan:   1. Wellness examination (Primary) check fasting labs  2. Vascular dementia without behavioral disturbance (HCC) continue donezepil 10mg daily  3. Mixed hyperlipidemia on statin recheck panel        No follow-ups on file.    Emmy Gonzalez MD, 11/18/2024, 3:38 PM

## 2024-11-23 ENCOUNTER — LAB ENCOUNTER (OUTPATIENT)
Dept: LAB | Age: 79
End: 2024-11-23
Attending: INTERNAL MEDICINE
Payer: MEDICAID

## 2024-11-23 DIAGNOSIS — E78.2 MIXED HYPERLIPIDEMIA: ICD-10-CM

## 2024-11-23 DIAGNOSIS — E55.9 VITAMIN D DEFICIENCY: ICD-10-CM

## 2024-11-23 DIAGNOSIS — Z00.00 WELLNESS EXAMINATION: ICD-10-CM

## 2024-11-23 LAB
ALBUMIN SERPL-MCNC: 4.6 G/DL (ref 3.2–4.8)
ALBUMIN/GLOB SERPL: 1.6 {RATIO} (ref 1–2)
ALP LIVER SERPL-CCNC: 78 U/L
ALT SERPL-CCNC: 11 U/L
ANION GAP SERPL CALC-SCNC: 10 MMOL/L (ref 0–18)
AST SERPL-CCNC: 18 U/L (ref ?–34)
BASOPHILS # BLD AUTO: 0.07 X10(3) UL (ref 0–0.2)
BASOPHILS NFR BLD AUTO: 1.4 %
BILIRUB SERPL-MCNC: 0.8 MG/DL (ref 0.2–1.1)
BUN BLD-MCNC: 12 MG/DL (ref 9–23)
BUN/CREAT SERPL: 13 (ref 10–20)
CALCIUM BLD-MCNC: 10.2 MG/DL (ref 8.7–10.4)
CHLORIDE SERPL-SCNC: 108 MMOL/L (ref 98–112)
CHOLEST SERPL-MCNC: 262 MG/DL (ref ?–200)
CO2 SERPL-SCNC: 26 MMOL/L (ref 21–32)
CREAT BLD-MCNC: 0.92 MG/DL
DEPRECATED RDW RBC AUTO: 47.3 FL (ref 35.1–46.3)
EGFRCR SERPLBLD CKD-EPI 2021: 63 ML/MIN/1.73M2 (ref 60–?)
EOSINOPHIL # BLD AUTO: 0.11 X10(3) UL (ref 0–0.7)
EOSINOPHIL NFR BLD AUTO: 2.2 %
ERYTHROCYTE [DISTWIDTH] IN BLOOD BY AUTOMATED COUNT: 13.5 % (ref 11–15)
FASTING PATIENT LIPID ANSWER: YES
FASTING STATUS PATIENT QL REPORTED: YES
GLOBULIN PLAS-MCNC: 2.9 G/DL (ref 2–3.5)
GLUCOSE BLD-MCNC: 88 MG/DL (ref 70–99)
HCT VFR BLD AUTO: 41.8 %
HDLC SERPL-MCNC: 62 MG/DL (ref 40–59)
HGB BLD-MCNC: 13.7 G/DL
IMM GRANULOCYTES # BLD AUTO: 0.01 X10(3) UL (ref 0–1)
IMM GRANULOCYTES NFR BLD: 0.2 %
LDLC SERPL CALC-MCNC: 177 MG/DL (ref ?–100)
LYMPHOCYTES # BLD AUTO: 2.03 X10(3) UL (ref 1–4)
LYMPHOCYTES NFR BLD AUTO: 40.2 %
MCH RBC QN AUTO: 31.1 PG (ref 26–34)
MCHC RBC AUTO-ENTMCNC: 32.8 G/DL (ref 31–37)
MCV RBC AUTO: 94.8 FL
MONOCYTES # BLD AUTO: 0.52 X10(3) UL (ref 0.1–1)
MONOCYTES NFR BLD AUTO: 10.3 %
NEUTROPHILS # BLD AUTO: 2.31 X10 (3) UL (ref 1.5–7.7)
NEUTROPHILS # BLD AUTO: 2.31 X10(3) UL (ref 1.5–7.7)
NEUTROPHILS NFR BLD AUTO: 45.7 %
NONHDLC SERPL-MCNC: 200 MG/DL (ref ?–130)
OSMOLALITY SERPL CALC.SUM OF ELEC: 297 MOSM/KG (ref 275–295)
PLATELET # BLD AUTO: 258 10(3)UL (ref 150–450)
POTASSIUM SERPL-SCNC: 4.2 MMOL/L (ref 3.5–5.1)
PROT SERPL-MCNC: 7.5 G/DL (ref 5.7–8.2)
RBC # BLD AUTO: 4.41 X10(6)UL
SODIUM SERPL-SCNC: 144 MMOL/L (ref 136–145)
T4 FREE SERPL-MCNC: 1.1 NG/DL (ref 0.8–1.7)
TRIGL SERPL-MCNC: 131 MG/DL (ref 30–149)
TSI SER-ACNC: 4.08 UIU/ML (ref 0.55–4.78)
VIT D+METAB SERPL-MCNC: 25.8 NG/ML (ref 30–100)
VLDLC SERPL CALC-MCNC: 26 MG/DL (ref 0–30)
WBC # BLD AUTO: 5.1 X10(3) UL (ref 4–11)

## 2024-11-23 PROCEDURE — 85025 COMPLETE CBC W/AUTO DIFF WBC: CPT

## 2024-11-23 PROCEDURE — 36415 COLL VENOUS BLD VENIPUNCTURE: CPT

## 2024-11-23 PROCEDURE — 82306 VITAMIN D 25 HYDROXY: CPT

## 2024-11-23 PROCEDURE — 80053 COMPREHEN METABOLIC PANEL: CPT

## 2024-11-23 PROCEDURE — 80061 LIPID PANEL: CPT

## 2024-11-23 PROCEDURE — 84443 ASSAY THYROID STIM HORMONE: CPT

## 2024-11-23 PROCEDURE — 84439 ASSAY OF FREE THYROXINE: CPT

## (undated) DEVICE — SUTURE PDS II 4-0 PS-2

## (undated) DEVICE — LAP CHOLE: Brand: MEDLINE INDUSTRIES, INC.

## (undated) DEVICE — Device: Brand: DEFENDO AIR/WATER/SUCTION AND BIOPSY VALVE

## (undated) DEVICE — SOL  .9 3000ML

## (undated) DEVICE — SOL  .9 1000ML BTL

## (undated) DEVICE — OPEN-END URETERAL CATHETER SOF-FLEX: Brand: SOF-FLEX

## (undated) DEVICE — DERMABOND LIQUID ADHESIVE

## (undated) DEVICE — ENDOSCOPY PACK - LOWER: Brand: MEDLINE INDUSTRIES, INC.

## (undated) DEVICE — DISPOSABLE SUCTION/IRRIGATOR TUBE SET: Brand: AHTO

## (undated) DEVICE — 12 ML SYRINGE LUER-LOCK TIP: Brand: MONOJECT

## (undated) DEVICE — FORCEP RADIAL JAW 4

## (undated) DEVICE — TROCAR: Brand: KII FIOS FIRST ENTRY

## (undated) DEVICE — ENCORE® LATEX MICRO SIZE 7.5, STERILE LATEX POWDER-FREE SURGICAL GLOVE: Brand: ENCORE

## (undated) DEVICE — TISSUE RETRIEVAL SYSTEM: Brand: INZII RETRIEVAL SYSTEM

## (undated) DEVICE — TROCARS: Brand: KII® BLUNT TIP ACCESS SYSTEM

## (undated) DEVICE — [HIGH FLOW INSUFFLATOR,  DO NOT USE IF PACKAGE IS DAMAGED,  KEEP DRY,  KEEP AWAY FROM SUNLIGHT,  PROTECT FROM HEAT AND RADIOACTIVE SOURCES.]: Brand: PNEUMOSURE

## (undated) DEVICE — NEEDLE HPO 18GA 1.5IN ECLPS

## (undated) DEVICE — ENDOSCOPY PACK UPPER: Brand: MEDLINE INDUSTRIES, INC.

## (undated) DEVICE — Device: Brand: JELCO

## (undated) DEVICE — TROCARS: Brand: KII® BALLOON BLUNT TIP SYSTEM

## (undated) DEVICE — LIGACLIP 10-M/L, 10MM ENDOSCOPIC ROTATING MULTIPLE CLIP APPLIERS: Brand: LIGACLIP

## (undated) DEVICE — TROCAR: Brand: KII® SLEEVE

## (undated) DEVICE — SUTURE VICRYL 0 UR-6

## (undated) DEVICE — 3 ML SYRINGE LUER-LOCK TIP: Brand: MONOJECT

## (undated) NOTE — LETTER
[facility]  Administration of Anesthesia  1. Phil White, or _________________________________ acting on her behalf, (Patient) (Dependent/Representative) request to receive anesthesia for my pending procedure/operation/treatment.   A infections, high spinal block, spinal bleeding, seizure, cardiac arrest and death. 7. AWARENESS: I understand that it is possible (but unlikely) to have explicit memory of events from the operating room while under general anesthesia.   8. ELECTROCONVULSIV unconscious pt /Relationship    My signature below affirms that prior to the time of the procedure, I have explained to the patient and/or his/her guardian, the risks and benefits of undergoing anesthesia, as well as any reasonable alternatives.     _______

## (undated) NOTE — LETTER
Jeny Suazo, [facility] [address]  [facility], [facility]       08/12/20        Patient: Estefany Caller   YOB: 1945   Date of Visit: 8/12/2020       Dear  Dr. Karthik Coleman MD,      Thank you for referring Estefany Caller to my practice.   As

## (undated) NOTE — MR AVS SNAPSHOT
[address]  [phone]  [phone]               Thank you for choosing us for your health care visit [facility].   We are glad to serve you and happy to provide you with

## (undated) NOTE — MR AVS SNAPSHOT
[address]y [address]ena [address]  [phone]  [phone]               Thank you for choosing us for your [facility]   We are glad to serve you and happy to provide you with Please arrive at your scheduled appointment time. Wear comfortable, loose fitting clothing.             Jun 08, 2017  9:15 AM   [place] [facility] Visit By Therapist with Jovanny Gan, [facility]

## (undated) NOTE — MR AVS SNAPSHOT
[address] at [address] [phone]  [phone]               Thank you for choosing us for your health care visit with Gricel Silverio MD.  We are glad to serve you and happy to provide you with [facility] Instructions:   To schedule a test at any [facility], call Central Scheduling at [phone], Monday through Friday between 7:30am to 6pm and on Saturday between 8am and 1pm. Evening and weekend appointments for your exam are a insurance company's guidelines for prior authorization for this test.  You may be held responsible for payment in full if proper authorization is not acquired.   Please contact the Patient Business Office at [phone] if you have any questions related to Your physician has referred you to a specialist.  Your physician or the clinic staff will provide you with the phone number you should call to schedule your appointment.      If you are confident that your benefit plan will not require a referral or authori Enter your [employer] Activation Code exactly as it appears below along with your Zip Code and Date of Birth to complete the sign-up process. If you do not sign up before the expiration date, you must request a new code.     Your unique [employer] Access Code: [facility] Eat plenty of protein, keep the fat content low Sugars:  sodas and sports drinks, candies and desserts   Eat plenty of low-fat dairy products High fat meats and dairy   Choose whole grain products Foods high in sodium   Water is best for hydration Fast vickey

## (undated) NOTE — MR AVS SNAPSHOT
Davey Mena 12 [facility] 43 [id number]  [phone]  [phone]               Thank you for choosing us for your health care visit with Jose Elias Maddox PT.   We are glad to serve you and happy to provide you with Please arrive at your scheduled appointment time. Wear comfortable, loose fitting clothing.             Jun 12, 2017  9:00 AM   [place] [facility] Visit By Therapist with Maira Client, [address] [facility]

## (undated) NOTE — MR AVS SNAPSHOT
Davey [address]ena [address]  [phone]  [phone]               Thank you for choosing us for your health care visit with Hira Bowden OT.   We are glad to serve you and happy to provide you with [place] [facility] Visit By Therapist with Stacey Gonzalez, [address]  ([facility])    [address]   [phone]           Please arrive at your scheduled appointment t

## (undated) NOTE — MR AVS SNAPSHOT
Davey Mena [address]  [phone]  [phone]               Thank you for choosing us for your health care visit with [facility]   We are glad to serve you and happy to provide you with Please arrive at your scheduled appointment time. Wear comfortable, loose fitting clothing. [employer]     Sign up for [employer], your secure online medical record.   [employer] will allow you to access patient instructions from your recent visit,  v

## (undated) NOTE — LETTER
[address] [place] [address]  Authorization for Invasive Procedures  1.  I hereby authorize Dr. Tracie Mike physician and whomever may be designated as the doctor's assistant, to perform the following operation and/or procedure:  Colonoscopy an performed for the purposes of advancing medicine, science, and/or education, provided my identity is not revealed. If the procedure has been videotaped, the physician/surgeon will obtain the original videotape.  The hospital will not be responsible for stor My signature below affirms that prior to the time of the procedure, I have explained to the patient and/or her legal representative, the risks and benefits involved in the proposed treatment and any reasonable alternative to the proposed treatment.  I have

## (undated) NOTE — MR AVS SNAPSHOT
Davey Mena 12 [facility] 43 [address]  [phone]  [phone]               Thank you for choosing us for your health care visit with Jose Elias Maddox PT.   We are glad to serve you and happy to provide you with Please arrive at your scheduled appointment time. Wear comfortable, loose fitting clothing.             Rosendo 15, 2017  9:15 AM   [place] [facility] Visit By Therapist with Jamin Nelson, [facility]

## (undated) NOTE — MR AVS SNAPSHOT
Davey Mena [address]  [phone]  [phone]               Thank you for choosing us for your health care visit with [facility].   We are glad to serve you and happy to provide you with information, go to [email or web address] and click on the Sign Up Now link in the [employer] box. Enter your [employer] Activation Code exactly as it appears below along with your Zip Code and Date of Birth to complete the sign-up process.  If you do

## (undated) NOTE — Clinical Note
Dear Dr. Jhony Sevilla    This letter is to inform you that Nevaeh Nagy was seen by me for Occupational Therapy. See below for my most recent plan of care.     Patient Name: Nevaeh Nagy, : 3/9/1945, MRN: [id number]   Date:  2017  Referring Thank you for your referral. Please send order for physical therapy. If you have any questions, please contact me at Dept: [phone].     Sincerely,  Yolande Salazar    Electronically signed by therapist: Yolande Salazar OT